# Patient Record
Sex: MALE | Race: WHITE | Employment: FULL TIME | URBAN - NONMETROPOLITAN AREA
[De-identification: names, ages, dates, MRNs, and addresses within clinical notes are randomized per-mention and may not be internally consistent; named-entity substitution may affect disease eponyms.]

---

## 2017-03-01 PROBLEM — I10 ESSENTIAL HYPERTENSION: Status: ACTIVE | Noted: 2017-03-01

## 2018-04-30 ENCOUNTER — HOSPITAL ENCOUNTER (OUTPATIENT)
Age: 58
Discharge: HOME | End: 2018-04-30
Payer: COMMERCIAL

## 2018-04-30 DIAGNOSIS — R07.9 CHEST PAIN, UNSPECIFIED TYPE: ICD-10-CM

## 2018-04-30 LAB
TROPONIN INTERP: NORMAL
TROPONIN T: <0.03 NG/ML

## 2018-04-30 PROCEDURE — 84484 ASSAY OF TROPONIN QUANT: CPT

## 2018-04-30 PROCEDURE — 36415 COLL VENOUS BLD VENIPUNCTURE: CPT

## 2018-05-25 PROBLEM — N52.9 ERECTILE DYSFUNCTION: Status: ACTIVE | Noted: 2018-05-25

## 2018-06-15 PROBLEM — R19.5 POSITIVE FIT (FECAL IMMUNOCHEMICAL TEST): Status: ACTIVE | Noted: 2018-06-15

## 2018-06-15 PROBLEM — Z80.0 FAMILY HISTORY OF COLON CANCER: Status: ACTIVE | Noted: 2018-06-15

## 2018-07-10 ENCOUNTER — HOSPITAL ENCOUNTER (OUTPATIENT)
Dept: LAB | Age: 58
Discharge: HOME | End: 2018-07-10
Payer: COMMERCIAL

## 2018-07-10 DIAGNOSIS — I10 ESSENTIAL HYPERTENSION: ICD-10-CM

## 2018-07-10 LAB
CREATININE URINE: 104.8 MG/DL (ref 39–259)
MICROALBUMIN/CREAT 24H UR: 14 MG/L
MICROALBUMIN/CREAT UR-RTO: 13 MCG/MG CREAT
PROSTATE SPECIFIC ANTIGEN: 0.78 UG/L

## 2018-07-10 PROCEDURE — 36415 COLL VENOUS BLD VENIPUNCTURE: CPT

## 2018-07-10 PROCEDURE — G0103 PSA SCREENING: HCPCS

## 2018-07-10 PROCEDURE — 82043 UR ALBUMIN QUANTITATIVE: CPT

## 2018-07-10 PROCEDURE — 82570 ASSAY OF URINE CREATININE: CPT

## 2018-07-16 ENCOUNTER — ANESTHESIA EVENT (OUTPATIENT)
Dept: OPERATING ROOM | Age: 58
End: 2018-07-16
Payer: COMMERCIAL

## 2018-07-16 ENCOUNTER — HOSPITAL ENCOUNTER (OUTPATIENT)
Age: 58
Setting detail: OUTPATIENT SURGERY
Discharge: HOME | End: 2018-07-16
Attending: INTERNAL MEDICINE | Admitting: INTERNAL MEDICINE
Payer: COMMERCIAL

## 2018-07-16 ENCOUNTER — ANESTHESIA (OUTPATIENT)
Dept: OPERATING ROOM | Age: 58
End: 2018-07-16
Payer: COMMERCIAL

## 2018-07-16 VITALS
HEART RATE: 60 BPM | TEMPERATURE: 97 F | WEIGHT: 245 LBS | DIASTOLIC BLOOD PRESSURE: 60 MMHG | SYSTOLIC BLOOD PRESSURE: 107 MMHG | RESPIRATION RATE: 18 BRPM | BODY MASS INDEX: 35.07 KG/M2 | OXYGEN SATURATION: 99 % | HEIGHT: 70 IN

## 2018-07-16 VITALS
RESPIRATION RATE: 13 BRPM | OXYGEN SATURATION: 100 % | SYSTOLIC BLOOD PRESSURE: 108 MMHG | DIASTOLIC BLOOD PRESSURE: 61 MMHG

## 2018-07-16 PROCEDURE — 3700000001 HC ADD 15 MINUTES (ANESTHESIA): Performed by: INTERNAL MEDICINE

## 2018-07-16 PROCEDURE — 2580000003 HC RX 258: Performed by: NURSE ANESTHETIST, CERTIFIED REGISTERED

## 2018-07-16 PROCEDURE — 2580000003 HC RX 258: Performed by: INTERNAL MEDICINE

## 2018-07-16 PROCEDURE — 7100000011 HC PHASE II RECOVERY - ADDTL 15 MIN: Performed by: INTERNAL MEDICINE

## 2018-07-16 PROCEDURE — 7100000010 HC PHASE II RECOVERY - FIRST 15 MIN: Performed by: INTERNAL MEDICINE

## 2018-07-16 PROCEDURE — 45385 COLONOSCOPY W/LESION REMOVAL: CPT | Performed by: INTERNAL MEDICINE

## 2018-07-16 PROCEDURE — 6360000002 HC RX W HCPCS: Performed by: NURSE ANESTHETIST, CERTIFIED REGISTERED

## 2018-07-16 PROCEDURE — 3700000000 HC ANESTHESIA ATTENDED CARE: Performed by: INTERNAL MEDICINE

## 2018-07-16 PROCEDURE — 2500000003 HC RX 250 WO HCPCS: Performed by: NURSE ANESTHETIST, CERTIFIED REGISTERED

## 2018-07-16 PROCEDURE — C1773 RET DEV, INSERTABLE: HCPCS | Performed by: INTERNAL MEDICINE

## 2018-07-16 PROCEDURE — 3609010600 HC COLONOSCOPY POLYPECTOMY SNARE/COLD BIOPSY: Performed by: INTERNAL MEDICINE

## 2018-07-16 PROCEDURE — 2709999900 HC NON-CHARGEABLE SUPPLY: Performed by: INTERNAL MEDICINE

## 2018-07-16 PROCEDURE — 88305 TISSUE EXAM BY PATHOLOGIST: CPT

## 2018-07-16 RX ORDER — PROPOFOL 10 MG/ML
INJECTION, EMULSION INTRAVENOUS CONTINUOUS PRN
Status: DISCONTINUED | OUTPATIENT
Start: 2018-07-16 | End: 2018-07-16 | Stop reason: SDUPTHER

## 2018-07-16 RX ORDER — SODIUM CHLORIDE 9 MG/ML
INJECTION, SOLUTION INTRAVENOUS CONTINUOUS PRN
Status: DISCONTINUED | OUTPATIENT
Start: 2018-07-16 | End: 2018-07-16 | Stop reason: SDUPTHER

## 2018-07-16 RX ORDER — PROPOFOL 10 MG/ML
INJECTION, EMULSION INTRAVENOUS PRN
Status: DISCONTINUED | OUTPATIENT
Start: 2018-07-16 | End: 2018-07-16 | Stop reason: SDUPTHER

## 2018-07-16 RX ORDER — LIDOCAINE HYDROCHLORIDE 20 MG/ML
INJECTION, SOLUTION INFILTRATION; PERINEURAL PRN
Status: DISCONTINUED | OUTPATIENT
Start: 2018-07-16 | End: 2018-07-16 | Stop reason: SDUPTHER

## 2018-07-16 RX ORDER — SODIUM CHLORIDE, SODIUM LACTATE, POTASSIUM CHLORIDE, CALCIUM CHLORIDE 600; 310; 30; 20 MG/100ML; MG/100ML; MG/100ML; MG/100ML
INJECTION, SOLUTION INTRAVENOUS CONTINUOUS
Status: DISCONTINUED | OUTPATIENT
Start: 2018-07-16 | End: 2018-07-16 | Stop reason: HOSPADM

## 2018-07-16 RX ADMIN — LIDOCAINE HYDROCHLORIDE 40 MG: 20 INJECTION, SOLUTION INFILTRATION; PERINEURAL at 15:33

## 2018-07-16 RX ADMIN — PROPOFOL 30 MG: 10 INJECTION, EMULSION INTRAVENOUS at 15:39

## 2018-07-16 RX ADMIN — PHENYLEPHRINE HYDROCHLORIDE 100 MCG: 10 INJECTION INTRAVENOUS at 16:24

## 2018-07-16 RX ADMIN — PROPOFOL 20 MG: 10 INJECTION, EMULSION INTRAVENOUS at 16:30

## 2018-07-16 RX ADMIN — SODIUM CHLORIDE, POTASSIUM CHLORIDE, SODIUM LACTATE AND CALCIUM CHLORIDE: 600; 310; 30; 20 INJECTION, SOLUTION INTRAVENOUS at 14:41

## 2018-07-16 RX ADMIN — PROPOFOL 30 MG: 10 INJECTION, EMULSION INTRAVENOUS at 15:43

## 2018-07-16 RX ADMIN — PROPOFOL 30 MG: 10 INJECTION, EMULSION INTRAVENOUS at 15:35

## 2018-07-16 RX ADMIN — PROPOFOL 80 MG: 10 INJECTION, EMULSION INTRAVENOUS at 15:33

## 2018-07-16 RX ADMIN — SODIUM CHLORIDE: 9 INJECTION, SOLUTION INTRAVENOUS at 16:26

## 2018-07-16 RX ADMIN — PROPOFOL 160 MCG/KG/MIN: 10 INJECTION, EMULSION INTRAVENOUS at 15:43

## 2018-07-16 RX ADMIN — PHENYLEPHRINE HYDROCHLORIDE 50 MCG: 10 INJECTION INTRAVENOUS at 16:38

## 2018-07-16 ASSESSMENT — PAIN - FUNCTIONAL ASSESSMENT: PAIN_FUNCTIONAL_ASSESSMENT: 0-10

## 2018-07-16 NOTE — PROGRESS NOTES
Discharge Criteria    Inpatients must meet Criteria 1 through 7. All other patients are either YES or N/A. If a NO is chosen then Anesthesia or Surgeon must be notified. 1.  Minimum 30 minutes after last dose of sedative medication, minimum 120 minutes after last dose of reversal agent. Yes      2. Systolic BP stable within 20 mmHg for 30 minutes & systolic BP between 90 & 987 or within 10 mmHg of baseline. Yes      3. Pulse between 60 and 100 or within 10 bpm of baseline. Yes      4. Spontaneous respiratory rate >/= 10 per minute. Yes      5. SaO2 >/= 95 or  >/= baseline. Yes      6. Able to cough and swallow or return to baseline function. Yes      7. Alert and oriented or return to baseline mental status. Yes      8. Demonstrates controlled, coordinated movements, ambulates with steady gait, or return to baseline activity function. Yes      9. Minimal or no pain or nausea, or at a level tolerable and acceptable to patient. Yes      10. Takes and retains oral fluids as allowed. Yes      11. Procedural / perioperative site stable. Minimal or no bleeding. Yes          12. If GI endoscopy procedure, minimal or no abdominal distention or passing flatus. Yes      13. Written discharge instructions and emergency telephone number provided. Yes      14. Accompanied by a responsible adult. Yes      Adult patient discharged from facility without responsible person meets above criteria plus the following:   a) remains awake without stimulus for 30 minutes     b) oriented appropriate for age      c) all vital signs stable   d) no significant risk of losing protective reflexes      e) able to maintain pre-procedure mobility without assistance   f) no nausea or dizziness      g) transportation arrangements that do not require patient to operate motor Vehicle.      N/A

## 2018-07-16 NOTE — ANESTHESIA PRE PROCEDURE
ringers infusion   Intravenous Continuous Marek Monte  mL/hr at 07/16/18 1441         Allergies:  No Known Allergies    Problem List:    Patient Active Problem List   Diagnosis Code    Varicose veins of both lower extremities I83.93    Type 2 diabetes, uncontrolled, with neuropathy (HCC) E11.40, E11.65    Diabetic retinopathy associated with diabetes mellitus due to underlying condition ([facility] E08.319    Essential hypertension I10    Erectile dysfunction N52.9    Positive FIT (fecal immunochemical test) R19.5    Family history of colon cancer Z80.0       Past Medical History:        Diagnosis Date    DM [facility]diabetes mellitus) [facility]     Varicose veins of both legs with edema     many years       Past Surgical History:        Procedure Laterality Date    OTHER SURGICAL HISTORY      Laser surgery both eyes for diabetic retinopathy       Social History:    Social History   Substance Use Topics    Smoking status: Never Smoker    Smokeless tobacco: Never Used    Alcohol use Yes      Comment: VERY RARE                                Counseling given: Not Answered      Vital Signs (Current):   Vitals:    07/16/18 1417   BP: 112/81   Pulse: 88   Resp: 18   TempSrc: Temporal   SpO2: 98%   Weight: 245 lb (111.1 kg)   Height: 5' 10\" (1.778 m)                                              BP Readings from Last 3 Encounters:   07/16/18 112/81   05/25/18 138/72   04/30/18 131/80       NPO Status: Time of last liquid consumption: 1245 (finished prep at 0845, had cup of water at 1245)                        Time of last solid consumption: 1100                        Date of last liquid consumption: 07/16/18                        Date of last solid food consumption: 07/15/18    BMI:   Wt Readings from Last 3 Encounters:   07/16/18 245 lb (111.1 kg)   05/25/18 260 lb (117.9 kg)   01/26/18 269 lb (122 kg)     Body mass index is 35.15 kg/m².     CBC:   Lab Results   Component Value Date    WBC 6.9 11/08/2016    RBC 5.38 11/08/2016    HGB 15.4 11/08/2016    HCT 46.2 11/08/2016    MCV 85.9 11/08/2016    RDW 13.5 11/08/2016     11/08/2016       CMP:   Lab Results   Component Value Date     11/08/2016    K 4.3 11/08/2016     11/08/2016    CO2 28 11/08/2016    BUN 12 11/08/2016    CREATININE 0.78 11/08/2016    GFRAA >60 11/08/2016    LABGLOM >60 11/08/2016    GLUCOSE 86 11/08/2016    PROT 7.2 11/08/2016    CALCIUM 9.4 11/08/2016    BILITOT 0.46 11/08/2016    ALKPHOS 65 11/08/2016    AST 24 11/08/2016    ALT 32 11/08/2016       POC Tests: No results for input(s): POCGLU, POCNA, POCK, POCCL, POCBUN, POCHEMO, POCHCT in the last 72 hours. Coags: No results found for: PROTIME, INR, APTT    HCG (If Applicable): No results found for: PREGTESTUR, PREGSERUM, HCG, HCGQUANT     ABGs: No results found for: PHART, PO2ART, UGM0OOE, OSF5VNH, BEART, C0SNWMGP     Type & Screen (If Applicable):  No results found for: LABABO, [address]    Anesthesia Evaluation  Patient summary reviewed and Nursing notes reviewed no history of anesthetic complications:   Airway: Mallampati: II  TM distance: >3 FB   Neck ROM: full  Mouth opening: > = 3 FB Dental:          Pulmonary:Negative Pulmonary ROS breath sounds clear to auscultation                             Cardiovascular:  Exercise tolerance: good (>4 METS),   (+) hypertension: mild,         Rhythm: regular  Rate: normal           Beta Blocker:  Not on Beta Blocker         Neuro/Psych:   Negative Neuro/Psych ROS              GI/Hepatic/Renal:   (+) bowel prep,           Endo/Other:    (+) DiabetesType II DM, well controlled, using insulin, . Pt had PAT visit. Abdominal:       Abdomen: soft. Vascular: negative vascular ROS. Anesthesia Plan      general and TIVA     ASA 2     (Plan discussed with patient. Pt drank water around 1 pm.  Discussed NPO status and risks.  )  Induction: intravenous.       Anesthetic plan and risks discussed with

## 2018-07-18 LAB — SURGICAL PATHOLOGY REPORT: NORMAL

## 2018-07-23 DIAGNOSIS — Z80.0 FAMILY HISTORY OF COLON CANCER: ICD-10-CM

## 2018-07-23 DIAGNOSIS — R19.5 POSITIVE FIT (FECAL IMMUNOCHEMICAL TEST): ICD-10-CM

## 2020-02-04 ENCOUNTER — HOSPITAL ENCOUNTER (OUTPATIENT)
Age: 60
Discharge: HOME | End: 2020-02-04
Payer: COMMERCIAL

## 2020-02-04 LAB
BUN BLDV-MCNC: 19 MG/DL (ref 6–20)
CREAT SERPL-MCNC: 0.96 MG/DL (ref 0.7–1.2)
GFR AFRICAN AMERICAN: >60 ML/MIN
GFR NON-AFRICAN AMERICAN: >60 ML/MIN
GFR SERPL CREATININE-BSD FRML MDRD: NORMAL ML/MIN/{1.73_M2}
GFR SERPL CREATININE-BSD FRML MDRD: NORMAL ML/MIN/{1.73_M2}

## 2020-02-04 PROCEDURE — 84520 ASSAY OF UREA NITROGEN: CPT

## 2020-02-04 PROCEDURE — 82565 ASSAY OF CREATININE: CPT

## 2020-02-04 PROCEDURE — 36415 COLL VENOUS BLD VENIPUNCTURE: CPT

## 2020-12-07 PROBLEM — E11.3499 SEVERE NONPROLIFERATIVE DIABETIC RETINOPATHY ASSOCIATED WITH TYPE 2 DIABETES MELLITUS (HCC): Status: ACTIVE | Noted: 2019-10-25

## 2020-12-07 PROBLEM — E11.3519 PROLIFERATIVE RETINOPATHY WITH RETINAL EDEMA DUE TO TYPE 2 DIABETES MELLITUS (HCC): Status: ACTIVE | Noted: 2019-10-25

## 2020-12-07 PROBLEM — H25.13 AGE-RELATED NUCLEAR CATARACT OF BOTH EYES: Status: ACTIVE | Noted: 2019-10-25

## 2021-04-06 RX ORDER — BLOOD SUGAR DIAGNOSTIC
1 STRIP MISCELLANEOUS 2 TIMES DAILY
Qty: 100 EACH | Refills: 3 | Status: SHIPPED | OUTPATIENT
Start: 2021-04-06 | End: 2022-05-06

## 2021-04-12 ENCOUNTER — OFFICE VISIT (OUTPATIENT)
Dept: PRIMARY CARE CLINIC | Age: 61
End: 2021-04-12
Payer: COMMERCIAL

## 2021-04-12 ENCOUNTER — HOSPITAL ENCOUNTER (OUTPATIENT)
Age: 61
Discharge: HOME | End: 2021-04-12
Payer: COMMERCIAL

## 2021-04-12 VITALS
RESPIRATION RATE: 16 BRPM | WEIGHT: 262 LBS | DIASTOLIC BLOOD PRESSURE: 86 MMHG | BODY MASS INDEX: 36.68 KG/M2 | HEIGHT: 71 IN | HEART RATE: 76 BPM | SYSTOLIC BLOOD PRESSURE: 138 MMHG

## 2021-04-12 DIAGNOSIS — Z12.5 ENCOUNTER FOR PROSTATE CANCER SCREENING: ICD-10-CM

## 2021-04-12 DIAGNOSIS — Z79.4 TYPE 2 DIABETES MELLITUS WITH DIABETIC NEUROPATHY, WITH LONG-TERM CURRENT USE OF INSULIN (HCC): ICD-10-CM

## 2021-04-12 DIAGNOSIS — I10 ESSENTIAL HYPERTENSION: ICD-10-CM

## 2021-04-12 DIAGNOSIS — D12.6 ADENOMATOUS POLYP OF COLON, UNSPECIFIED PART OF COLON: ICD-10-CM

## 2021-04-12 DIAGNOSIS — E11.40 TYPE 2 DIABETES MELLITUS WITH DIABETIC NEUROPATHY, WITH LONG-TERM CURRENT USE OF INSULIN (HCC): Primary | ICD-10-CM

## 2021-04-12 DIAGNOSIS — E11.40 TYPE 2 DIABETES MELLITUS WITH DIABETIC NEUROPATHY, WITH LONG-TERM CURRENT USE OF INSULIN (HCC): ICD-10-CM

## 2021-04-12 DIAGNOSIS — Z79.4 TYPE 2 DIABETES MELLITUS WITH DIABETIC NEUROPATHY, WITH LONG-TERM CURRENT USE OF INSULIN (HCC): Primary | ICD-10-CM

## 2021-04-12 LAB
ABSOLUTE EOS #: 0.39 K/UL (ref 0–0.44)
ABSOLUTE IMMATURE GRANULOCYTE: 0.03 K/UL (ref 0–0.3)
ABSOLUTE LYMPH #: 1.47 K/UL (ref 1.1–3.7)
ABSOLUTE MONO #: 0.65 K/UL (ref 0.1–1.2)
ALBUMIN SERPL-MCNC: 4.3 G/DL (ref 3.5–5.2)
ALBUMIN/GLOBULIN RATIO: 1.4 (ref 1–2.5)
ALP BLD-CCNC: 61 U/L (ref 40–129)
ALT SERPL-CCNC: 44 U/L (ref 5–41)
ANION GAP SERPL CALCULATED.3IONS-SCNC: 9 MMOL/L (ref 9–17)
AST SERPL-CCNC: 24 U/L
BASOPHILS # BLD: 1 % (ref 0–2)
BASOPHILS ABSOLUTE: 0.04 K/UL (ref 0–0.2)
BILIRUB SERPL-MCNC: 0.45 MG/DL (ref 0.3–1.2)
BUN BLDV-MCNC: 13 MG/DL (ref 6–20)
BUN/CREAT BLD: 15 (ref 9–20)
CALCIUM SERPL-MCNC: 10.2 MG/DL (ref 8.6–10.4)
CHLORIDE BLD-SCNC: 101 MMOL/L (ref 98–107)
CHOLESTEROL/HDL RATIO: 4.5
CHOLESTEROL: 190 MG/DL
CO2: 26 MMOL/L (ref 20–31)
CREAT SERPL-MCNC: 0.87 MG/DL (ref 0.7–1.2)
DIFFERENTIAL TYPE: ABNORMAL
EOSINOPHILS RELATIVE PERCENT: 6 % (ref 1–4)
GFR AFRICAN AMERICAN: >60 ML/MIN
GFR NON-AFRICAN AMERICAN: >60 ML/MIN
GFR SERPL CREATININE-BSD FRML MDRD: ABNORMAL ML/MIN/{1.73_M2}
GFR SERPL CREATININE-BSD FRML MDRD: ABNORMAL ML/MIN/{1.73_M2}
GLUCOSE BLD-MCNC: 173 MG/DL (ref 70–99)
HBA1C MFR BLD: 8.5 %
HCT VFR BLD CALC: 43.7 % (ref 40.7–50.3)
HDLC SERPL-MCNC: 42 MG/DL
HEMOGLOBIN: 15 G/DL (ref 13–17)
IMMATURE GRANULOCYTES: 1 %
LDL CHOLESTEROL: 111 MG/DL (ref 0–130)
LYMPHOCYTES # BLD: 22 % (ref 24–43)
MCH RBC QN AUTO: 29.7 PG (ref 25.2–33.5)
MCHC RBC AUTO-ENTMCNC: 34.3 G/DL (ref 28.4–34.8)
MCV RBC AUTO: 86.5 FL (ref 82.6–102.9)
MONOCYTES # BLD: 10 % (ref 3–12)
NRBC AUTOMATED: 0 PER 100 WBC
PDW BLD-RTO: 12.6 % (ref 11.8–14.4)
PLATELET # BLD: 211 K/UL (ref 138–453)
PLATELET ESTIMATE: ABNORMAL
PMV BLD AUTO: 10.5 FL (ref 8.1–13.5)
POTASSIUM SERPL-SCNC: 5.2 MMOL/L (ref 3.7–5.3)
PROSTATE SPECIFIC ANTIGEN: 0.89 UG/L
RBC # BLD: 5.05 M/UL (ref 4.21–5.77)
RBC # BLD: ABNORMAL 10*6/UL
SEG NEUTROPHILS: 60 % (ref 36–65)
SEGMENTED NEUTROPHILS ABSOLUTE COUNT: 4.04 K/UL (ref 1.5–8.1)
SODIUM BLD-SCNC: 136 MMOL/L (ref 135–144)
TOTAL PROTEIN: 7.3 G/DL (ref 6.4–8.3)
TRIGL SERPL-MCNC: 185 MG/DL
VLDLC SERPL CALC-MCNC: ABNORMAL MG/DL (ref 1–30)
WBC # BLD: 6.6 K/UL (ref 3.5–11.3)
WBC # BLD: ABNORMAL 10*3/UL

## 2021-04-12 PROCEDURE — 36415 COLL VENOUS BLD VENIPUNCTURE: CPT

## 2021-04-12 PROCEDURE — 80061 LIPID PANEL: CPT

## 2021-04-12 PROCEDURE — 83036 HEMOGLOBIN GLYCOSYLATED A1C: CPT | Performed by: FAMILY MEDICINE

## 2021-04-12 PROCEDURE — 85025 COMPLETE CBC W/AUTO DIFF WBC: CPT

## 2021-04-12 PROCEDURE — G0103 PSA SCREENING: HCPCS

## 2021-04-12 PROCEDURE — 80053 COMPREHEN METABOLIC PANEL: CPT

## 2021-04-12 PROCEDURE — 3052F HG A1C>EQUAL 8.0%<EQUAL 9.0%: CPT | Performed by: FAMILY MEDICINE

## 2021-04-12 PROCEDURE — 99214 OFFICE O/P EST MOD 30 MIN: CPT | Performed by: FAMILY MEDICINE

## 2021-04-12 RX ORDER — INSULIN LISPRO 100 [IU]/ML
INJECTION, SOLUTION INTRAVENOUS; SUBCUTANEOUS
Qty: 1 PEN | Refills: 3 | Status: SHIPPED | OUTPATIENT
Start: 2021-04-12

## 2021-04-12 ASSESSMENT — PATIENT HEALTH QUESTIONNAIRE - PHQ9
1. LITTLE INTEREST OR PLEASURE IN DOING THINGS: 1
SUM OF ALL RESPONSES TO PHQ QUESTIONS 1-9: 2
SUM OF ALL RESPONSES TO PHQ QUESTIONS 1-9: 2
SUM OF ALL RESPONSES TO PHQ9 QUESTIONS 1 & 2: 2
SUM OF ALL RESPONSES TO PHQ QUESTIONS 1-9: 2

## 2021-04-12 NOTE — PATIENT INSTRUCTIONS
SURVEY:    You may be receiving a survey from [employer] regarding your visit today. You may get this in the mail, through your MyChart, or in your email. Please complete the survey to enable us to provide the highest quality of care to you and your family. If you cannot score us a very good (5 Stars) on any question, please call the office to discuss how we could of made your experience exceptional.    Thank you!     Dr. Aj Diallo, WESLEY  [facility], [address] [place] [address], [facility]    Phone: [phone]  Fax: [phone]    Office Hours:   Jose Navarro [place], F: 8-5 Wednesday: 9-11

## 2021-04-12 NOTE — PROGRESS NOTES
Manuel Cabrera is a 62 y.o. male here for routine follow up of diabetes. He has been checking  his blood glucose levels regularly. He reports numbness and tingling in the feet. He said he has neuropathy. He has not been compliant with diet and exercise. He has been compliant with his medications. He is tolerating medications. Hypertension: Patient here for follow-up of elevated blood pressure. He is not exercising but will be more in the summer and is adherent to low salt diet. Blood pressure is not well controlled at home. Cardiac symptoms palpitations. Patient denies chest pain and fatigue. Cardiovascular risk factors: advanced age (older than 54 for men, 72 for women), diabetes mellitus, hypertension and male gender. Use of agents associated with hypertension: none. Allergies:  Patient has no known allergies.     Past Medical History:    Past Medical History:   Diagnosis Date    DM [facility]diabetes mellitus) ([facility]     Varicose veins of both legs with edema     many years       Past Surgical History:    Past Surgical History:   Procedure Laterality Date    COLONOSCOPY N/A 7/16/2018    COLONOSCOPY POLYPECTOMY SNARE/COLD BIOPSY performed by Rachel Gallardo MD at [address] [place] [address]  07/16/2018    -polyps(adenomatouspolyps,portions of adenomatous polyps and tubulovillous adenoma)    OTHER SURGICAL HISTORY      Laser surgery both eyes for diabetic retinopathy       Social History:   Social History     Tobacco Use    Smoking status: Never Smoker    Smokeless tobacco: Never Used   Substance Use Topics    Alcohol use: Yes     Comment: VERY RARE       Family History:   Family History   Problem Relation Age of Onset    Diabetes Mother     Emphysema Father     Diabetes Father     Colon Cancer Father          Review of Systems:  Constitutional: negative for fever or chills  Eyes: negative for visual disturbance   ENT: negative for sore throat or nasal congestion  Respiratory: negative for cough, shortness of breath and sputum  Cardiovascular: negative for chest pain,pnd,claudication or palpitations  Gastrointestinal: negative for abd pain, stella,nausea, vomiting, diarrhea or constipation  Genitourinary: negative for dysuria,,hematuria urgency or frequency  Musculoskeletal:negative for arthralgias, muscle weakness and stiff joints   Integument/breast: negative for skin rash or lesions  Neurological: positive for   numbness and tingling. Psych: negative for anxiety, depression, suicidial ideation and suicidal attempt. Objective:  Physical Exam:  /86 (Site: Right Upper Arm, Position: Sitting)   Pulse 76   Resp 16   Ht 5' 11\" (1.803 m)   Wt 262 lb (118.8 kg)   BMI 36.54 kg/m²   GEN:   He is alert and oriented  ENT:    ENT exam normal, no neck nodes or sinus tenderness  NECK:   neck supple and non tender without mass, no thyromegaly or thyroid nodules, no cervical lymphadenopathy  EYES:   No gross abnormalities. CVS:     CVS exam BP noted to be well controlled today in office, S1, S2 normal, no gallop, no murmur, chest clear, no JVD, no HSM, no edema  PULM:   chest clear, no wheezing, rales, normal symmetric air entry, no tachypnea, retractions or cyanosis  ABD:   exam deferred  MUSC: no joint tenderness, deformity or swelling  Skin : no  rash or lesions  EXT: {EXTREMITIES EXAM:56724::\"Extremities: + 2 pedal pulses, no edema or calf tenderness, and warm to touch. Nails -deformed,has multiple calocities  FEET:  no open sores are present bilaterally  NEURO: monofilament decreased bilaterally DTR -normal        Diagnostic Data:  Lab Results   Component Value Date    GLUCOSE 86 11/08/2016    LABA1C 8.5 04/12/2021    BUN 19 02/04/2020     11/08/2016    K 4.3 11/08/2016    CALCIUM 9.4 11/08/2016     11/08/2016    CO2 28 11/08/2016       Assessment:  1. Type 2 diabetes mellitus with diabetic neuropathy, with long-term current use of insulin ([facility]    2.  Essential hypertension    3. Encounter for prostate cancer screening    4. Adenomatous polyp of colon, unspecified part of colon      Plan   Diagnosis Orders   1. Type 2 diabetes mellitus with diabetic neuropathy, with long-term current use of insulin (HCC)  POCT glycosylated hemoglobin (Hb A1C)    CBC Auto Differential    Comprehensive Metabolic Panel    Lipid Panel     DIABETES FOOT EXAM   2. Essential hypertension     3. Encounter for prostate cancer screening  PSA screening   4. Adenomatous polyp of colon, unspecified part of colon  Edwige Rivas [facility], [facility]       · Check BS 2 times per day  · Medication change: none  · Encouraged low fat, low sodium and diabetic, 1800 calorie diet.   · Goal for blood pressure control is 120/80  · Recommended regular exercise as tolerated, 5 times per week  · Labs reviewed -hba1c 8.6  · Labs ordered: cbc,cmp,lipid,psa  ascivd risk  Orders Placed This Encounter   Procedures    CBC Auto Differential     Standing Status:   Future     Standing Expiration Date:   4/12/2022    Comprehensive Metabolic Panel     Standing Status:   Future     Standing Expiration Date:   4/12/2022    Lipid Panel     Standing Status:   Future     Standing Expiration Date:   4/12/2022     Order Specific Question:   Is Patient Fasting?/# of Hours     Answer:   yes    PSA screening     Standing Status:   Future     Standing Expiration Date:   4/12/2022   Charles Holland MD, [facility]     Referral Priority:   Routine     Referral Type:   Eval and Treat     Referral Reason:   Specialty Services Required     Referred to Provider:   Jenni Peabody, MD     Requested Specialty:   General Surgery     Number of Visits Requested:   1    POCT glycosylated hemoglobin (Hb A1C)     DIABETES FOOT EXAM       Current Outpatient Medications   Medication Sig Dispense Refill    insulin lispro, 1 Unit Dial, (HUMALOG KWIKPEN) 100 UNIT/ML SOPN Inject twice a day as per coverage schedule FS <150 -no coverage 151-200 - 4 units 201-250 - 6 units 251-300 - 8 units 301- 350 - 10 units 351- 400 -12 units >401- call MD 1 pen 3    Insulin Syringe-Needle U-100 (RELION INSULIN SYRINGE 1ML/31G) 31G X 5/16\" 1 ML MISC Inject 1 each into the skin 2 times daily 100 each 3    Insulin Pen Needle (PEN NEEDLES) 31G X 5 MM MISC Inject 1 Units into the skin 2 times daily 100 each 1    pioglitazone (ACTOS) 30 MG tablet Take 1 tablet by mouth daily 90 tablet 2    Insulin NPH Isophane & Regular (HUMULIN;NOVOLIN) (70-30) 100 UNIT per ML injection pen Inject 60 Units into the skin 2 times daily 5 pen 3    metFORMIN (GLUCOPHAGE) 500 MG tablet Take 1 tablet by mouth 2 times daily (with meals) 180 tablet 3    glucose blood VI test strips (ASCENSIA AUTODISC VI;ONE TOUCH ULTRA TEST VI) strip 1 each by In Vitro route daily As needed. 100 each 3    INSULIN SYRINGE 1CC/29G (AIMSCO INSULIN SYR ULTRA THIN) 29G X 1/2\" 1 ML MISC 1 each by Does not apply route 2 times daily DX:E11.9 200 each 3    losartan (COZAAR) 25 MG tablet Take 2 tablets by mouth daily (Patient not taking: Reported on 9/22/2020) 90 tablet 0    aspirin 81 MG tablet Take 81 mg by mouth daily      atorvastatin (LIPITOR) 20 MG tablet Take 1 tablet by mouth daily (Patient not taking: Reported on 9/22/2020) 90 tablet 0     No current facility-administered medications for this visit. No follow-ups on file.       Electronically signed by Aidee Martinez MD on 4/12/2021 at 9:59 AM

## 2021-05-07 ENCOUNTER — OFFICE VISIT (OUTPATIENT)
Dept: SURGERY | Age: 61
End: 2021-05-07
Payer: COMMERCIAL

## 2021-05-07 VITALS
SYSTOLIC BLOOD PRESSURE: 150 MMHG | TEMPERATURE: 98.6 F | WEIGHT: 262 LBS | DIASTOLIC BLOOD PRESSURE: 78 MMHG | BODY MASS INDEX: 36.54 KG/M2 | HEART RATE: 86 BPM

## 2021-05-07 DIAGNOSIS — Z01.818 PRE-OP TESTING: Primary | ICD-10-CM

## 2021-05-07 DIAGNOSIS — Z86.010 HISTORY OF COLON POLYPS: Primary | ICD-10-CM

## 2021-05-07 DIAGNOSIS — Z80.0 FAMILY HISTORY OF COLON CANCER: ICD-10-CM

## 2021-05-07 PROCEDURE — 99999 PR OFFICE/OUTPT VISIT,PROCEDURE ONLY: CPT | Performed by: SURGERY

## 2021-05-07 NOTE — LETTER
[facility] GENERAL SURGERY Part of [place] [address]  Phone: [phone]  Fax: [phone]    Jacqueline Wilson MD        May 9, 2021     Arcelia Pandey MD  [facility] [address]    Patient: Emanuel Drummond  MR Number: [id number]  YOB: 1960  Date of Visit: 5/7/2021    Dear Dr. Arcelia Pandey:    Thank you for the request for consultation for [facility] to me for the evaluation of history of colon polyps. Below are the relevant portions of my assessment and plan of care. ASSESSMENT     Diagnosis Orders   1. History of colon polyps     2. BMI 36.0-36.9,adult     3. Family history of colon cancer         PLAN    We will proceed with screening colonoscopy in the coming weeks. Risks, benefits, alternatives thoroughly reviewed and accepted by Sarah Trejo, including GI bleeding, perforation, missed lesions, COVID-19 exposure/infection, etc.    If you have questions, please do not hesitate to call me. I look forward to following Ari Lucas along with you.     Sincerely,        Jacqueline Wilson MD

## 2021-05-09 ASSESSMENT — ENCOUNTER SYMPTOMS
TROUBLE SWALLOWING: 0
COUGH: 0
BACK PAIN: 1
VOMITING: 0
BLOOD IN STOOL: 0
CHOKING: 0
SHORTNESS OF BREATH: 0
NAUSEA: 0
ABDOMINAL PAIN: 0
SORE THROAT: 0

## 2021-05-09 NOTE — PATIENT INSTRUCTIONS
Patient Education        Learning About Colonoscopy  What is a colonoscopy? A colonoscopy is a test (also called a procedure) that lets a doctor look inside your large intestine. The doctor uses a thin, lighted tube called a colonoscope. The doctor uses it to look for small growths called polyps, colon or rectal cancer (colorectal cancer), or other problems like bleeding. During the procedure, the doctor can take samples of tissue. The samples can then be checked for cancer or other conditions. The doctor can also take out polyps. How is a colonoscopy done? This procedure is done in a doctor's office or a clinic or hospital. You will get medicine to help you relax and not feel pain. Some people find that they don't remember having the test because of the medicine. The doctor gently moves the colonoscope, or scope, through the colon. The scope is also a small video camera. It lets the doctor see the colon and take pictures. How do you prepare for the procedure? You need to clean out your colon before the procedure so the doctor can see all of your colon. This process may start a day or two before the test. This depends on which \"colon prep\" your doctor recommends. To clean your colon, you stop eating solid foods and drink only clear liquids. You can have water, tea, coffee, clear juices, clear broths, flavored ice pops, and gelatin (such as Jell-O). Do not drink anything red or purple. The day or night before the procedure, you drink a large amount of a special liquid. This causes loose, frequent stools. You will go to the bathroom a lot. It's very important to drink all of the liquid. If you have problems drinking it, call your doctor. Some people don't go to work or do their usual activities on the day of the prep. Arrange to have someone take you home after the test.  What can you expect after a colonoscopy? Your doctor will tell you when you can eat and do your usual activities.   Drink a lot of fluid after the test to replace the fluids you may have lost during the colon prep. But don't drink alcohol. Your doctor will talk to you about when you'll need your next colonoscopy. The results of your test and your risk for colorectal cancer will help your doctor decide how often you need to be checked. After the test, you may be bloated or have gas pains. You may need to pass gas. If a biopsy was done or a polyp was removed, you may have streaks of blood in your stool (feces) for a few days. If polyps were taken out, your doctor may tell you to avoid taking aspirin and nonsteroidal anti-inflammatory drugs (NSAIDs) for 7 to 14 days. Problems such as heavy rectal bleeding may not occur until several weeks after the test. This isn't common. But it can happen after polyps are removed. Follow-up care is a key part of your treatment and safety. Be sure to make and go to all appointments, and call your doctor if you are having problems. It's also a good idea to know your test results and keep a list of the medicines you take. Where can you learn more? Go to https://goOutMap.[employer]. org and sign in to your [employer] account. Enter X208 in the [facility] box to learn more about \"Learning About Colonoscopy. \"     If you do not have an account, please click on the \"Sign Up Now\" link. Current as of: December 17, 2020               Content Version: 12.8  © 4556-1430 [employer]. Care instructions adapted under license by [facility] [place] ([facility]). If you have questions about a medical condition or this instruction, always ask your healthcare professional. [place] [address] any warranty or liability for your use of this information.

## 2021-05-09 NOTE — PROGRESS NOTES
Terese aRe MD  General Surgery, Endoscopy  Chief Medical Officer    [address] [facility]  [address] [address]  Dept: [phone]  Fax: [phone] Decker  Chief Complaint   Patient presents with    New Patient     colonoscopy consult. Last scope 2018-MD Manjinder. Hx polyps. Family hx colon cancer-grandmother, father, sister. Denies change in bowel habits. C/o occasional constipation, reports using metamucil, helping. Denies bleeding. Referred by Timothy Espinoza MD       HPI    Mr Ravindra Nicholas is a pleasant 60-year-old white male kindly referred to me by Dr. Timothy Espinoza for screening colonoscopy. He has no GI complaints. No abdominal pain. Normal appetite. No recent weight changes, 262 pounds, BMI 37. Bowel habits are typically formed and brown, without blood. Occasional constipation. No cough, fever nor other respiratory symptoms. No history of COVID-19. Colonoscopy July 2018 with tubulovillous cecal polypectomy. Adenomatous polypectomies of the descending and transverse colon also provided. Family history of colon cancer involving father, sister, paternal grandmother. Patient has never smoked. Review of Systems   Constitutional: Negative for activity change, appetite change, chills, fever and unexpected weight change. HENT: Negative for nosebleeds, sneezing, sore throat and trouble swallowing. Eyes: Negative for visual disturbance. Respiratory: Negative for cough, choking and shortness of breath. Cardiovascular: Negative for chest pain, palpitations and leg swelling. Gastrointestinal: Negative for abdominal pain, blood in stool, nausea and vomiting. Genitourinary: Negative for dysuria, flank pain and hematuria. Musculoskeletal: Positive for arthralgias and back pain. Negative for gait problem and myalgias. Allergic/Immunologic: Negative for immunocompromised state. Neurological: Negative for dizziness, seizures, syncope, weakness and headaches. Hematological: Does not bruise/bleed easily. Psychiatric/Behavioral: Negative for confusion and sleep disturbance. Past Medical History:   Diagnosis Date    DM (diabetes mellitus) ([address]     Varicose veins of both legs with edema     many years       Past Surgical History:   Procedure Laterality Date    COLONOSCOPY N/A 7/16/2018    COLONOSCOPY POLYPECTOMY SNARE/COLD BIOPSY performed by Rob Gusman MD at [address] [place] [address]  07/16/2018    -polyps(adenomatouspolyps,portions of adenomatous polyps and tubulovillous adenoma)    OTHER SURGICAL HISTORY      Laser surgery both eyes for diabetic retinopathy       Family History   Problem Relation Age of Onset    Diabetes Mother     Emphysema Father     Diabetes Father     Colon Cancer Father        Allergies:  See list    Current Outpatient Medications   Medication Sig Dispense Refill    insulin lispro, 1 Unit Dial, (HUMALOG KWIKPEN) 100 UNIT/ML SOPN Inject twice a day as per coverage schedule FS <150 -no coverage 151-200 - 4 units 201-250 - 6 units 251-300 - 8 units 301- 350 - 10 units 351- 400 -12 units >401- call MD 1 pen 3    Insulin NPH Isophane & Regular (HUMULIN;NOVOLIN) (70-30) 100 UNIT per ML injection pen Inject 60 Units into the skin 2 times daily 5 pen 3    Insulin Syringe-Needle U-100 (RELION INSULIN SYRINGE 1ML/31G) 31G X 5/16\" 1 ML MISC Inject 1 each into the skin 2 times daily 100 each 3    Insulin Pen Needle (PEN NEEDLES) 31G X 5 MM MISC Inject 1 Units into the skin 2 times daily 100 each 1    pioglitazone (ACTOS) 30 MG tablet Take 1 tablet by mouth daily (Patient not taking: Reported on 5/7/2021) 90 tablet 2    metFORMIN (GLUCOPHAGE) 500 MG tablet Take 1 tablet by mouth 2 times daily (with meals) (Patient not taking: Reported on 5/7/2021) 180 tablet 3    glucose blood VI test strips (ASCENSIA AUTODISC VI;ONE TOUCH ULTRA TEST VI) strip 1 each by In Vitro route daily As needed.  100 each 3    INSULIN SYRINGE 1CC/29G (AIMSCO INSULIN SYR ULTRA THIN) 29G X 1/2\" 1 ML MISC 1 each by Does not apply route 2 times daily DX:E11.9 200 each 3    losartan (COZAAR) 25 MG tablet Take 2 tablets by mouth daily (Patient not taking: Reported on 9/22/2020) 90 tablet 0    aspirin 81 MG tablet Take 81 mg by mouth daily      atorvastatin (LIPITOR) 20 MG tablet Take 1 tablet by mouth daily (Patient not taking: Reported on 5/7/2021) 90 tablet 0     No current facility-administered medications for this visit.         Social History     Socioeconomic History    Marital status: Legally      Spouse name: Not on file    Number of children: 2    Years of education: Not on file    Highest education level: Not on file   Occupational History    Occupation:    Social Needs    Financial resource strain: Not on file    Food insecurity     Worry: Not on file     Inability: Not on file   [email or web address] needs     Medical: Not on file     Non-medical: Not on file   Tobacco Use    Smoking status: Never Smoker    Smokeless tobacco: Never Used   Substance and Sexual Activity    Alcohol use: Yes     Comment: VERY RARE    Drug use: No    Sexual activity: Not Currently   Lifestyle    Physical activity     Days per week: Not on file     Minutes per session: Not on file    Stress: Not on file   Relationships    Social connections     Talks on phone: More than three times a week     Gets together: Once a week     Attends [employer] service: Not on file     Active member of club or organization: Not on file     Attends meetings of clubs or organizations: Not on file     Relationship status:     Intimate partner violence     Fear of current or ex partner: Not on file     Emotionally abused: Not on file     Physically abused: Not on file     Forced sexual activity: Not on file   Other Topics Concern    Not on file   Social History Narrative    Not on file       BP (!) 150/78   Pulse 86   Temp 98.6 °F (37 °C)   Wt 262 lb (118.8 kg)   BMI 36.54 kg/m²      Physical Exam  Vitals signs and nursing note reviewed. Constitutional:       Appearance: He is well-developed. HENT:      Head: Normocephalic and atraumatic. Eyes:      General: No scleral icterus. Conjunctiva/sclera: Conjunctivae normal.      Pupils: Pupils are equal, round, and reactive to light. Neck:      Musculoskeletal: Normal range of motion and neck supple. Vascular: No JVD. Trachea: No tracheal deviation. Cardiovascular:      Rate and Rhythm: Normal rate and regular rhythm. Pulmonary:      Effort: Pulmonary effort is normal. No respiratory distress. Chest:      Chest wall: No tenderness. Abdominal:      General: There is no distension. Palpations: Abdomen is soft. There is no mass. Tenderness: There is no abdominal tenderness. There is no guarding or rebound. Musculoskeletal: Normal range of motion. Lymphadenopathy:      Cervical: No cervical adenopathy. Skin:     General: Skin is warm and dry. Findings: No erythema or rash. Neurological:      Mental Status: He is alert and oriented to person, place, and time. Cranial Nerves: No cranial nerve deficit. Psychiatric:         Behavior: Behavior normal.         Thought Content:  Thought content normal.         Judgment: Judgment normal.         IMAGING/LABS    7/18/2018  2:40 PM - Reynold, [facility] Incoming Lab Results From [facility]    Component Collected Lab   Surgical Pathology Report 07/16/2018  3:31  [place]    (NOTE)   [id number]   [address],  [address] [place] [address] [place], [address]   [phone]   Fax: [phone]     0 [facility]     Patient Name: Fabián Rubio: [id number]   Path Number: [id number]   Collected: 7/16/2018   Received: 7/17/2018   Reported: 7/18/2018 14:40     -- Diagnosis --   1.  CECUM, BIOPSIES:       -  PORTIONS OF ADENOMATOUS POLYPS AND TUBULOVILLOUS ADENOMA. 2.  DESCENDING COLON, BIOPSIES:       -  ADENOMATOUS POLYPS. 3.  TRANSVERSE COLON, BIOPSIES:       -  ADENOMATOUS POLYPS. Felix Francois M.D.   **Electronically Signed Out**         jet/7/18/2018       Clinical Information   Operative Findings:  CECAL POLYPS; DESCENDING COLON POLYPS; TRANSVERSE   COLON POLYPS   Operation Performed:  COLONOSCOPY, POLYPECTOMY SNARE/COLD BIOPSY     Source of Specimen   1: CECAL POLYPS   2: DESCENDING COLON POLYPS   3: TRANSVERSE COLON POLYPS     Gross Description   1.  \"SPENSER NORTH, CECAL POLYPS\" Multiple tan tissue fragments from <   0.1 to 0.4 cm and are 2.0 x 1.5 x 0.1 cm in aggregate.  Entirely 1cs. 2.  \"SPENSER NORTH, DESCENDING COLON POLYPS\" Two tan polypoid tissue   fragments from 0.6 to 1.2 cm and are 1.7 x 0.6 x 0.2 cm in aggregate. Entirely 1cs. 3.  \"SPENSER NORTH, TRANSVERSE COLON POLYPS\" Two tan polypoid tissue   fragments from 0.3 to 0.5 cm and are 1.2 x 0.5 x 0.2 cm in aggregate. Entirely 1cs.  po tm       Microscopic Description   1. Sections of colonic mucosa show crowded tubular and villous shaped   glands lined by hyperchromatic columnar epithelial cells. Ceil Elva is no   evidence of high grade dysplasia or invasive malignancy. 2. Sections of colonic mucosa show crowded tubular shaped glands lined   by hyperchromatic columnar epithelial cells. Ceil [place] is no evidence of   high grade dysplasia or invasive malignancy. 3. Sections of colonic mucosa show crowded tubular shaped glands lined   by hyperchromatic columnar epithelial cells. Ceil Elva is no evidence of   high grade dysplasia or invasive malignancy. Testing Performed By    Braydon Martin Name Director Address Valid Date Range   [address] [address] 08/30/17 0801-Present   Lab and Collection    Surgical Pathology - 7/16/2018      ASSESSMENT     Diagnosis Orders   1. History of colon polyps     2. BMI 36.0-36.9,adult     3. Family history of colon cancer         PLAN    We will proceed with screening colonoscopy in the coming weeks. Risks, benefits, alternatives thoroughly reviewed and accepted by Mr. Alonzo, including GI bleeding, perforation, missed lesions, COVID-19 exposure/infection, etc.     Henri Olivares MD

## 2021-07-06 ENCOUNTER — TELEPHONE (OUTPATIENT)
Dept: SURGERY | Age: 61
End: 2021-07-06

## 2021-07-06 ENCOUNTER — HOSPITAL ENCOUNTER (OUTPATIENT)
Age: 61
Discharge: HOME | End: 2021-07-06
Payer: COMMERCIAL

## 2021-07-06 LAB
EKG ATRIAL RATE: 88 BPM
EKG P AXIS: 47 DEGREES
EKG P-R INTERVAL: 168 MS
EKG Q-T INTERVAL: 346 MS
EKG QRS DURATION: 88 MS
EKG QTC CALCULATION (BAZETT): 418 MS
EKG R AXIS: 11 DEGREES
EKG T AXIS: 158 DEGREES
EKG VENTRICULAR RATE: 88 BPM

## 2021-07-06 PROCEDURE — 93005 ELECTROCARDIOGRAM TRACING: CPT | Performed by: SURGERY

## 2021-07-06 PROCEDURE — 93010 ELECTROCARDIOGRAM REPORT: CPT | Performed by: FAMILY MEDICINE

## 2021-07-19 ENCOUNTER — TELEPHONE (OUTPATIENT)
Dept: PRIMARY CARE CLINIC | Age: 61
End: 2021-07-19

## 2021-07-22 ENCOUNTER — OFFICE VISIT (OUTPATIENT)
Dept: PRIMARY CARE CLINIC | Age: 61
End: 2021-07-22
Payer: COMMERCIAL

## 2021-07-22 VITALS — HEART RATE: 84 BPM | RESPIRATION RATE: 16 BRPM | DIASTOLIC BLOOD PRESSURE: 88 MMHG | SYSTOLIC BLOOD PRESSURE: 152 MMHG

## 2021-07-22 DIAGNOSIS — Z79.4 TYPE 2 DIABETES MELLITUS WITH DIABETIC NEUROPATHY, WITH LONG-TERM CURRENT USE OF INSULIN (HCC): ICD-10-CM

## 2021-07-22 DIAGNOSIS — I10 ESSENTIAL HYPERTENSION: ICD-10-CM

## 2021-07-22 DIAGNOSIS — L08.9 TYPE 2 DIABETES MELLITUS WITH DIABETIC FOOT INFECTION (HCC): ICD-10-CM

## 2021-07-22 DIAGNOSIS — E11.40 TYPE 2 DIABETES MELLITUS WITH DIABETIC NEUROPATHY, WITH LONG-TERM CURRENT USE OF INSULIN (HCC): ICD-10-CM

## 2021-07-22 DIAGNOSIS — E11.628 TYPE 2 DIABETES MELLITUS WITH DIABETIC FOOT INFECTION (HCC): ICD-10-CM

## 2021-07-22 DIAGNOSIS — N17.9 ACUTE KIDNEY INJURY (NONTRAUMATIC) (HCC): Primary | ICD-10-CM

## 2021-07-22 LAB — HBA1C MFR BLD: 8 %

## 2021-07-22 PROCEDURE — 99214 OFFICE O/P EST MOD 30 MIN: CPT | Performed by: FAMILY MEDICINE

## 2021-07-22 PROCEDURE — 3052F HG A1C>EQUAL 8.0%<EQUAL 9.0%: CPT | Performed by: FAMILY MEDICINE

## 2021-07-22 PROCEDURE — 83036 HEMOGLOBIN GLYCOSYLATED A1C: CPT | Performed by: FAMILY MEDICINE

## 2021-07-22 RX ORDER — AMLODIPINE BESYLATE 5 MG/1
5 TABLET ORAL DAILY
Qty: 30 TABLET | Refills: 2 | Status: SHIPPED | OUTPATIENT
Start: 2021-07-22 | End: 2021-08-23 | Stop reason: SDUPTHER

## 2021-07-22 RX ORDER — HYDROCODONE BITARTRATE AND ACETAMINOPHEN 7.5; 325 MG/1; MG/1
TABLET ORAL
COMMUNITY
Start: 2021-07-18 | End: 2022-02-24

## 2021-07-22 RX ORDER — AMOXICILLIN AND CLAVULANATE POTASSIUM 500; 125 MG/1; MG/1
TABLET, FILM COATED ORAL
COMMUNITY
Start: 2021-07-18 | End: 2021-08-23

## 2021-07-22 RX ORDER — B COMPLEX, C NO.20/FOLIC ACID 1 MG
CAPSULE ORAL
COMMUNITY
Start: 2021-07-18 | End: 2021-08-23

## 2021-07-22 SDOH — ECONOMIC STABILITY: TRANSPORTATION INSECURITY
IN THE PAST 12 MONTHS, HAS LACK OF TRANSPORTATION KEPT YOU FROM MEETINGS, WORK, OR FROM GETTING THINGS NEEDED FOR DAILY LIVING?: NO

## 2021-07-22 SDOH — ECONOMIC STABILITY: FOOD INSECURITY: WITHIN THE PAST 12 MONTHS, YOU WORRIED THAT YOUR FOOD WOULD RUN OUT BEFORE YOU GOT MONEY TO BUY MORE.: NEVER TRUE

## 2021-07-22 SDOH — ECONOMIC STABILITY: FOOD INSECURITY: WITHIN THE PAST 12 MONTHS, THE FOOD YOU BOUGHT JUST DIDN'T LAST AND YOU DIDN'T HAVE MONEY TO GET MORE.: NEVER TRUE

## 2021-07-22 SDOH — ECONOMIC STABILITY: TRANSPORTATION INSECURITY
IN THE PAST 12 MONTHS, HAS THE LACK OF TRANSPORTATION KEPT YOU FROM MEDICAL APPOINTMENTS OR FROM GETTING MEDICATIONS?: NO

## 2021-07-22 ASSESSMENT — SOCIAL DETERMINANTS OF HEALTH (SDOH): HOW HARD IS IT FOR YOU TO PAY FOR THE VERY BASICS LIKE FOOD, HOUSING, MEDICAL CARE, AND HEATING?: NOT HARD AT ALL

## 2021-07-22 NOTE — LETTER
[facility]  [address] [place] [address]  Phone: [phone]  Fax: [phone]    Alvin Ruiz MD         July 22, 2021     Patient: Jazz Jennings   YOB: 1960   Date of Visit: 7/22/2021       To Whom It May Concern: It is my medical opinion that Xander Stagers requires a disability parking placard for the following reasons: using wheel chair due to inability to bear weight on rt foot    Duration of need: 3 months    If you have any questions or concerns, please don't hesitate to call.     Sincerely,        Alvin Ruiz MD

## 2021-07-22 NOTE — PATIENT INSTRUCTIONS
SURVEY:    You may be receiving a survey from [employer] regarding your visit today. You may get this in the mail, through your MyChart, or in your email. Please complete the survey to enable us to provide the highest quality of care to you and your family. If you cannot score us a very good (5 Stars) on any question, please call the office to discuss how we could of made your experience exceptional.    Thank you!     Dr. Gregory Dueñas, SUSANA Chirinos, [address] [place] [address] Abimael Green    Phone: [phone]  Fax: [phone]    Office Hours:   Seema Peacock, F: 8-5 Wednesday: 9-11

## 2021-07-22 NOTE — PROGRESS NOTES
Patient is here today for follow-up on hospitalization for getting metal in his foot, and it became infected. The IV antibiotics caused a decrease in kidney function. He began outpatient dialysis on Monday. Symptoms have-slightly improved. Current complaints-He states that he having some fatigue, and he also states that he is nausea and more gassy and has also had heartburn which he states makes him feel a little ill. He also states he is having minor pain in the foot. Medication change-Patient was put on Augmentin, and Norco. He also states that that they doubled his Insulin because his blood pressure had been running high. Follow-up with specialists-He goes to a podiatrist, Avni Hernandez, every week who repacks his dressing. He also states while he is at dialysis the kidney doctor swings by and checks in on him. Special needs-None. He had to cancel his colonoscopy due to being in the hospital. He was told to not take any aspirin due to the procedure so he hadn't been to prep for it. Allergies:  Patient has no known allergies.     Past Medical History:    Past Medical History:   Diagnosis Date    DM (diabetes mellitus) ([facility]     Varicose veins of both legs with edema     many years       Past Surgical History:    Past Surgical History:   Procedure Laterality Date    COLONOSCOPY N/A 7/16/2018    COLONOSCOPY POLYPECTOMY SNARE/COLD BIOPSY performed by Gallito Joseph MD at [address] [place] [address]  07/16/2018    -polyps(adenomatouspolyps,portions of adenomatous polyps and tubulovillous adenoma)    OTHER SURGICAL HISTORY      Laser surgery both eyes for diabetic retinopathy       Social History:   Social History     Tobacco Use    Smoking status: Never Smoker    Smokeless tobacco: Never Used   Substance Use Topics    Alcohol use: Yes     Comment: VERY RARE       Family History:   Family History   Problem Relation Age of Onset    Diabetes Mother     Emphysema Father     Diabetes Father     Colon Cancer Father          Review of Systems:  Constitutional: negative for fever or chills  Eyes: negative for visual disturbance   ENT: negative for sore throat or nasal congestion  Respiratory: negative for cough, shortness of breath and sputum  Cardiovascular: negative for chest pain,pnd,claudication or palpitations  Gastrointestinal: negative for abd pain, stella,nausea, vomiting, diarrhea or constipation  Genitourinary: negative for dysuria,,hematuria urgency or frequency  Musculoskeletal:negative for arthralgias, muscle weakness and stiff joints   Integument/breast: negative for skin rash or lesions  Neurological: positive for   numbness and tingling of fet. Psych: negative for anxiety, depression, suicidial ideation and suicidal attempt. Objective:  Physical Exam:  BP (!) 152/88   Pulse 84   Resp 16   GEN:   He is alert and oriented  ENT:    ENT exam normal, no neck nodes or sinus tenderness  NECK:   neck supple and non tender without mass, no thyromegaly or thyroid nodules, no cervical lymphadenopathy  EYES:   No gross abnormalities. CVS:     CVS exam BP noted to be well controlled today in office, S1, S2 normal, no gallop, no murmur, chest clear, no JVD, no HSM, no edema  PULM:   chest clear, no wheezing, rales, normal symmetric air entry, no tachypnea, retractions or cyanosis  ABD:   exam deferred  MUSC: has dressing rt foot   DTR diminished        Diagnostic Data:  Lab Results   Component Value Date    GLUCOSE 173 (H) 04/12/2021    LABA1C 8.0 07/22/2021    BUN 13 04/12/2021     04/12/2021    K 5.2 04/12/2021    CALCIUM 10.2 04/12/2021     04/12/2021    CO2 26 04/12/2021       Assessment:  1. Acute kidney injury (nontraumatic) ([address]    2. Type 2 diabetes mellitus with diabetic neuropathy, with long-term current use of insulin ([address]    3. Essential hypertension    4. Type 2 diabetes mellitus with diabetic foot infection ([facility]      Plan   Diagnosis Orders   1.  Acute kidney injury (nontraumatic) ([facility]  Continue dialysis and f/u with nephrology   2. Type 2 diabetes mellitus with diabetic neuropathy, with long-term current use of insulin ([facility])  hba1c 8,monitor blood sugars closely,follow diet and exercise,insulin coverage ac and hs   3. Essential hypertension  bp remains elevated. Add norvasc 5 mg daily   4. Type 2 diabetes mellitus with diabetic foot infection ([facility])  Continue augmentin and f/u with podiatry       · Check BS 3 times per day  · Medication change: add norvasc 5 mg daily  · Encouraged low fat, low sodium and diabetic, 1800 calorie diet. · Goal for blood pressure control is 130/80  · Recommended regular exercise as tolerated, 5 times per week  · Labs reviewed: cet3f-7  · Labs ordered: to have at dialysis  ascivd risk  Orders Placed This Encounter   Procedures    POCT glycosylated hemoglobin (Hb A1C)       Current Outpatient Medications   Medication Sig Dispense Refill    amLODIPine (NORVASC) 5 MG tablet Take 1 tablet by mouth daily 30 tablet 2    insulin lispro, 1 Unit Dial, (HUMALOG KWIKPEN) 100 UNIT/ML SOPN Inject twice a day as per coverage schedule FS <150 -no coverage 151-200 - 4 units 201-250 - 6 units 251-300 - 8 units 301- 350 - 10 units 351- 400 -12 units >401- call MD 1 pen 3    Insulin Syringe-Needle U-100 (RELION INSULIN SYRINGE 1ML/31G) 31G X 5/16\" 1 ML MISC Inject 1 each into the skin 2 times daily 100 each 3    Insulin Pen Needle (PEN NEEDLES) 31G X 5 MM MISC Inject 1 Units into the skin 2 times daily 100 each 1    Insulin NPH Isophane & Regular (HUMULIN;NOVOLIN) (70-30) 100 UNIT per ML injection pen Inject 60 Units into the skin 2 times daily 5 pen 3    glucose blood VI test strips (ASCENSIA AUTODISC VI;ONE TOUCH ULTRA TEST VI) strip 1 each by In Vitro route daily As needed.  100 each 3    INSULIN SYRINGE 1CC/29G (AIMSCO INSULIN SYR ULTRA THIN) 29G X 1/2\" 1 ML MISC 1 each by Does not apply route 2 times daily DX:E11.9 200 each 3    losartan (COZAAR) 25 MG tablet Take 2 tablets by mouth daily 90 tablet 0    amoxicillin-clavulanate (AUGMENTIN) 500-125 MG per tablet TAKE 1 TABLET BY MOUTH EVERY 12 HOURS      B Complex-C-Folic Acid (TRIPHROCAPS) 1 MG CAPS TAKE 1 CAPSULE BY MOUTH ONCE DAILY      HYDROcodone-acetaminophen (NORCO) 7.5-325 MG per tablet TAKE 1 TABLET BY MOUTH EVERY 4 TO 6 HOURS AS NEEDED FOR PAIN      pioglitazone (ACTOS) 30 MG tablet Take 1 tablet by mouth daily (Patient not taking: Reported on 5/7/2021) 90 tablet 2    metFORMIN (GLUCOPHAGE) 500 MG tablet Take 1 tablet by mouth 2 times daily (with meals) (Patient not taking: Reported on 5/7/2021) 180 tablet 3    aspirin 81 MG tablet Take 81 mg by mouth daily (Patient not taking: Reported on 7/22/2021)      atorvastatin (LIPITOR) 20 MG tablet Take 1 tablet by mouth daily (Patient not taking: Reported on 5/7/2021) 90 tablet 0     No current facility-administered medications for this visit. Return in about 1 month (around 8/22/2021).       Electronically signed by Yocasta Francis MD on 7/22/2021 at 11:21 AM

## 2021-08-06 ENCOUNTER — TELEPHONE (OUTPATIENT)
Dept: PRIMARY CARE CLINIC | Age: 61
End: 2021-08-06

## 2021-08-06 RX ORDER — LOSARTAN POTASSIUM 25 MG/1
50 TABLET ORAL DAILY
Qty: 180 TABLET | Refills: 0 | Status: SHIPPED | OUTPATIENT
Start: 2021-08-06

## 2021-08-06 NOTE — TELEPHONE ENCOUNTER
BP 1422/85 today, he is out of Losartan which was started while in the hospital. Do you want him to continue taking this?

## 2021-08-23 ENCOUNTER — OFFICE VISIT (OUTPATIENT)
Dept: PRIMARY CARE CLINIC | Age: 61
End: 2021-08-23
Payer: COMMERCIAL

## 2021-08-23 VITALS
SYSTOLIC BLOOD PRESSURE: 136 MMHG | DIASTOLIC BLOOD PRESSURE: 78 MMHG | RESPIRATION RATE: 16 BRPM | WEIGHT: 241.6 LBS | HEART RATE: 92 BPM | BODY MASS INDEX: 34.67 KG/M2

## 2021-08-23 DIAGNOSIS — E11.40 TYPE 2 DIABETES MELLITUS WITH DIABETIC NEUROPATHY, WITH LONG-TERM CURRENT USE OF INSULIN (HCC): Primary | ICD-10-CM

## 2021-08-23 DIAGNOSIS — I10 ESSENTIAL HYPERTENSION: ICD-10-CM

## 2021-08-23 DIAGNOSIS — Z79.4 TYPE 2 DIABETES MELLITUS WITH DIABETIC NEUROPATHY, WITH LONG-TERM CURRENT USE OF INSULIN (HCC): Primary | ICD-10-CM

## 2021-08-23 PROCEDURE — 99213 OFFICE O/P EST LOW 20 MIN: CPT | Performed by: FAMILY MEDICINE

## 2021-08-23 PROCEDURE — 3052F HG A1C>EQUAL 8.0%<EQUAL 9.0%: CPT | Performed by: FAMILY MEDICINE

## 2021-08-23 RX ORDER — FUROSEMIDE 40 MG/1
TABLET ORAL
COMMUNITY
Start: 2021-08-04

## 2021-08-23 RX ORDER — AMLODIPINE BESYLATE 5 MG/1
5 TABLET ORAL DAILY
Qty: 90 TABLET | Refills: 0 | Status: SHIPPED | OUTPATIENT
Start: 2021-08-23

## 2021-08-23 RX ORDER — CIPROFLOXACIN 250 MG/1
TABLET, FILM COATED ORAL
COMMUNITY
Start: 2021-08-20 | End: 2021-09-02 | Stop reason: ALTCHOICE

## 2021-08-23 NOTE — PATIENT INSTRUCTIONS
SURVEY:    You may be receiving a survey from [employer] regarding your visit today. You may get this in the mail, through your MyChart, or in your email. Please complete the survey to enable us to provide the highest quality of care to you and your family. If you cannot score us a very good (5 Stars) on any question, please call the office to discuss how we could of made your experience exceptional.    Thank you!     WESLEY Richardson RN   [facility], [address] [place] [address] Abimael Green    Phone: [phone]  Fax: [phone]    Office Hours:   Seema Portillo, F: 8-5 Wednesday: 9-11

## 2021-08-23 NOTE — PROGRESS NOTES
Aleena Manuel is a 62 y.o. male here for routine follow up of diabetes. He has been checking  his blood glucose levels regularly. His sugar this morning was 137, and yesterday was 154. He said these are in the morning fasting readings. He denies numbness and tingling in the hands and feet. He has been compliant with diet but not with exericse. He has been compliant with his medications. He is tolerating medications. Hypertension: Patient here for follow-up of elevated blood pressure. He is not exercising and is adherent to low salt diet. Patient does take blood pressure at home. He said that it is a little high, but it is coming down. He said it ranges between 140-85 area. Cardiac symptoms none. Patient denies chest pain, fatigue and palpitations. Cardiovascular risk factors: advanced age (older than 54 for men, 72 for women), diabetes mellitus, hypertension, male gender and obesity (BMI >= 30 kg/m2). Use of agents associated with hypertension: none. He was here last month for a hospital follow up for getting metal in his foot, and it became infected. The IV antibiotics caused a decrease in his kidney function. Due to this, he began outpatient dialysis. He was told on Friday that he no longer has to do dialysis. He said his kidney function is working again, and he gets the ports out on Wednesday. He also has a wound vac, he just began this on Friday and it gets changed 3 times a week. Allergies:  Patient has no known allergies.     Past Medical History:    Past Medical History:   Diagnosis Date    DM (diabetes mellitus) ([facility] [address]     Varicose veins of both legs with edema     many years       Past Surgical History:    Past Surgical History:   Procedure Laterality Date    COLONOSCOPY N/A 7/16/2018    COLONOSCOPY POLYPECTOMY SNARE/COLD BIOPSY performed by Nadege Swanson MD at [address]  07/16/2018    -polyps(adenomatouspolyps,portions of adenomatous polyps and tubulovillous adenoma)    OTHER SURGICAL HISTORY      Laser surgery both eyes for diabetic retinopathy       Social History:   Social History     Tobacco Use    Smoking status: Never Smoker    Smokeless tobacco: Never Used   Substance Use Topics    Alcohol use: Yes     Comment: VERY RARE       Family History:   Family History   Problem Relation Age of Onset    Diabetes Mother     Emphysema Father     Diabetes Father     Colon Cancer Father          Review of Systems:  Constitutional: negative for fever or chills  Eyes: negative for visual disturbance   ENT: negative for sore throat or nasal congestion  Respiratory: negative for cough, shortness of breath and sputum  Cardiovascular: negative for chest pain,pnd,claudication or palpitations  Gastrointestinal: negative for abd pain, stella,nausea, vomiting, diarrhea or constipation  Genitourinary: negative for dysuria,,hematuria urgency or frequency  Musculoskeletal:negative for arthralgias, muscle weakness and stiff joints   Integument/breast: has wound rt foot - on wound vac  Neurological: positive for   numbness and tingling of feet        Objective:  Physical Exam:  /78 (Site: Left Upper Arm, Position: Sitting)   Pulse 92   Resp 16   Wt 241 lb 9.6 oz (109.6 kg) Comment: HAS BOOT ON FOOT  BMI 34.67 kg/m²   GEN:   He is alert and oriented  ENT:    ENT exam normal, no neck nodes or sinus tenderness  NECK:   neck supple and non tender without mass, no thyromegaly or thyroid nodules, no cervical lymphadenopathy  EYES:   No gross abnormalities.   CVS:     CVS exam BP noted to be well controlled today in office, S1, S2 normal, no gallop, no murmur, chest clear, no JVD, no HSM, no edema  PULM:   chest clear, no wheezing, rales, normal symmetric air entry  ABD:   exam deferred  Skin : has wound vac rt foot          Diagnostic Data:  Lab Results   Component Value Date    GLUCOSE 173 (H) 04/12/2021    LABA1C 8.0 07/22/2021    BUN 13 04/12/2021     04/12/2021    K 5.2 04/12/2021    CALCIUM 10.2 04/12/2021     04/12/2021    CO2 26 04/12/2021       Assessment:  1. Type 2 diabetes mellitus with diabetic neuropathy, with long-term current use of insulin ([address]    2. Essential hypertension      Plan   Diagnosis Orders   1. Type 2 diabetes mellitus with diabetic neuropathy, with long-term current use of insulin ([facility])  Improving,continue close monitoring   2. Essential hypertension  Improved with addition of norvasc       · Check BS 2 times per day  · Medication change: continue norvasc for better control of htn  · Encouraged low fat, low sodium and diabetic, 1800 calorie diet. · Goal for blood pressure control is 120/80  · Recommended regular exercise as tolerated, 5 times per week  ascivd risk  No orders of the defined types were placed in this encounter. Current Outpatient Medications   Medication Sig Dispense Refill    furosemide (LASIX) 40 MG tablet TAKE 1 TABLET BY MOUTH ONCE DAILY      amLODIPine (NORVASC) 5 MG tablet Take 1 tablet by mouth daily 90 tablet 0    losartan (COZAAR) 25 MG tablet Take 2 tablets by mouth daily 180 tablet 0    insulin lispro, 1 Unit Dial, (HUMALOG KWIKPEN) 100 UNIT/ML SOPN Inject twice a day as per coverage schedule FS <150 -no coverage 151-200 - 4 units 201-250 - 6 units 251-300 - 8 units 301- 350 - 10 units 351- 400 -12 units >401- call MD 1 pen 3    Insulin Syringe-Needle U-100 (RELION INSULIN SYRINGE 1ML/31G) 31G X 5/16\" 1 ML MISC Inject 1 each into the skin 2 times daily 100 each 3    Insulin Pen Needle (PEN NEEDLES) 31G X 5 MM MISC Inject 1 Units into the skin 2 times daily 100 each 1    Insulin NPH Isophane & Regular (HUMULIN;NOVOLIN) (70-30) 100 UNIT per ML injection pen Inject 60 Units into the skin 2 times daily 5 pen 3    glucose blood VI test strips (ASCENSIA AUTODISC VI;ONE TOUCH ULTRA TEST VI) strip 1 each by In Vitro route daily As needed.  100 each 3    INSULIN SYRINGE 1CC/29G (AIMSCO INSULIN SYR ULTRA THIN) 29G X 1/2\" 1 ML MISC 1 each by Does not apply route 2 times daily DX:E11.9 200 each 3    ciprofloxacin (CIPRO) 250 MG tablet TAKE 1 TABLET BY MOUTH EVERY 24 HOURS      HYDROcodone-acetaminophen (NORCO) 7.5-325 MG per tablet TAKE 1 TABLET BY MOUTH EVERY 4 TO 6 HOURS AS NEEDED FOR PAIN      pioglitazone (ACTOS) 30 MG tablet Take 1 tablet by mouth daily (Patient not taking: Reported on 5/7/2021) 90 tablet 2    metFORMIN (GLUCOPHAGE) 500 MG tablet Take 1 tablet by mouth 2 times daily (with meals) (Patient not taking: Reported on 5/7/2021) 180 tablet 3    aspirin 81 MG tablet Take 81 mg by mouth daily (Patient not taking: Reported on 7/22/2021)      atorvastatin (LIPITOR) 20 MG tablet Take 1 tablet by mouth daily (Patient not taking: Reported on 5/7/2021) 90 tablet 0     No current facility-administered medications for this visit. No follow-ups on file.       Electronically signed by Whitley Culver MD on 8/23/2021 at 9:51 AM

## 2021-09-02 ENCOUNTER — HOSPITAL ENCOUNTER (OUTPATIENT)
Age: 61
Discharge: HOME | End: 2021-09-02
Payer: COMMERCIAL

## 2021-09-02 ENCOUNTER — OFFICE VISIT (OUTPATIENT)
Dept: PRIMARY CARE CLINIC | Age: 61
End: 2021-09-02
Payer: COMMERCIAL

## 2021-09-02 VITALS
TEMPERATURE: 97.5 F | HEART RATE: 92 BPM | DIASTOLIC BLOOD PRESSURE: 70 MMHG | SYSTOLIC BLOOD PRESSURE: 118 MMHG | WEIGHT: 240.3 LBS | BODY MASS INDEX: 34.48 KG/M2

## 2021-09-02 DIAGNOSIS — E08.42 DIABETIC POLYNEUROPATHY ASSOCIATED WITH DIABETES MELLITUS DUE TO UNDERLYING CONDITION (HCC): ICD-10-CM

## 2021-09-02 DIAGNOSIS — Z23 NEED FOR INFLUENZA VACCINATION: ICD-10-CM

## 2021-09-02 DIAGNOSIS — L08.9 SKIN INFECTION: Primary | ICD-10-CM

## 2021-09-02 LAB
ANION GAP SERPL CALCULATED.3IONS-SCNC: 13 MMOL/L (ref 9–17)
BUN BLDV-MCNC: 28 MG/DL (ref 8–23)
BUN/CREAT BLD: 24 (ref 9–20)
CALCIUM SERPL-MCNC: 9.7 MG/DL (ref 8.6–10.4)
CHLORIDE BLD-SCNC: 100 MMOL/L (ref 98–107)
CO2: 22 MMOL/L (ref 20–31)
CREAT SERPL-MCNC: 1.15 MG/DL (ref 0.7–1.2)
GFR AFRICAN AMERICAN: >60 ML/MIN
GFR NON-AFRICAN AMERICAN: >60 ML/MIN
GFR SERPL CREATININE-BSD FRML MDRD: ABNORMAL ML/MIN/{1.73_M2}
GFR SERPL CREATININE-BSD FRML MDRD: ABNORMAL ML/MIN/{1.73_M2}
GLUCOSE BLD-MCNC: 198 MG/DL (ref 70–99)
POTASSIUM SERPL-SCNC: 4.6 MMOL/L (ref 3.7–5.3)
SODIUM BLD-SCNC: 135 MMOL/L (ref 135–144)

## 2021-09-02 PROCEDURE — 80048 BASIC METABOLIC PNL TOTAL CA: CPT

## 2021-09-02 PROCEDURE — 99214 OFFICE O/P EST MOD 30 MIN: CPT | Performed by: FAMILY MEDICINE

## 2021-09-02 PROCEDURE — 90674 CCIIV4 VAC NO PRSV 0.5 ML IM: CPT | Performed by: FAMILY MEDICINE

## 2021-09-02 PROCEDURE — 90471 IMMUNIZATION ADMIN: CPT | Performed by: FAMILY MEDICINE

## 2021-09-02 PROCEDURE — 36415 COLL VENOUS BLD VENIPUNCTURE: CPT

## 2021-09-02 RX ORDER — GABAPENTIN 100 MG/1
100 CAPSULE ORAL 3 TIMES DAILY
Qty: 90 CAPSULE | Refills: 0 | Status: SHIPPED | OUTPATIENT
Start: 2021-09-02 | End: 2021-11-23

## 2021-09-02 RX ORDER — DOXYCYCLINE HYCLATE 100 MG
100 TABLET ORAL 2 TIMES DAILY
Qty: 20 TABLET | Refills: 0 | Status: SHIPPED | OUTPATIENT
Start: 2021-09-02 | End: 2021-09-12

## 2021-09-02 ASSESSMENT — PATIENT HEALTH QUESTIONNAIRE - PHQ9
SUM OF ALL RESPONSES TO PHQ9 QUESTIONS 1 & 2: 2
SUM OF ALL RESPONSES TO PHQ QUESTIONS 1-9: 2
2. FEELING DOWN, DEPRESSED OR HOPELESS: 2
SUM OF ALL RESPONSES TO PHQ QUESTIONS 1-9: 2
SUM OF ALL RESPONSES TO PHQ QUESTIONS 1-9: 2
1. LITTLE INTEREST OR PLEASURE IN DOING THINGS: 0

## 2021-09-02 NOTE — PROGRESS NOTES
Patient is here with complaints of itch, burn, and pain in torso area. Patient states this started three days ago. Patient states it's has itchiness and then feels like a bee sting and says it feels intense in certain spots. Patient states his back or says even to touch or scratch it transforms into tense pain and says it works around his body. Patient states last night he probably had 30 minutes of it and it faded away and says he was able to go to sleep. Patent states it kind of comes in waves at different times. Patient states he would like  to check where his port was. Patient states it has a little bit of a lump under the skin. Patient states it doesn't really feel warm and isn't red. Patient states he had it removed on August 23rd. CURRENT ALLERGIES: Patient has no known allergies. PAST MEDICAL HISTORY:   Past Medical History:   Diagnosis Date    DM (diabetes mellitus) ([facility] [address]     Varicose veins of both legs with edema     many years       SURGICAL HISTORY:   Past Surgical History:   Procedure Laterality Date    COLONOSCOPY N/A 7/16/2018    COLONOSCOPY POLYPECTOMY SNARE/COLD BIOPSY performed by Rowe Fabry, MD at [address] [place] [address]  07/16/2018    -polyps(adenomatouspolyps,portions of adenomatous polyps and tubulovillous adenoma)    OTHER SURGICAL HISTORY      Laser surgery both eyes for diabetic retinopathy       FAMILY HISTORY:   Family History   Problem Relation Age of Onset    Diabetes Mother     Emphysema Father     Diabetes Father     Colon Cancer Father        SOCIAL HISTORY:   Social History     Tobacco Use    Smoking status: Never Smoker    Smokeless tobacco: Never Used   Substance Use Topics    Alcohol use: Yes     Comment: VERY RARE    Drug use: No     Prior to Admission medications    Medication Sig Start Date End Date Taking?  Authorizing Provider   furosemide (LASIX) 40 MG tablet TAKE 1 TABLET BY MOUTH ONCE DAILY 8/4/21  Yes Historical Provider, MD amLODIPine (NORVASC) 5 MG tablet Take 1 tablet by mouth daily 8/23/21  Yes Chiquis Mahan MD   losartan (COZAAR) 25 MG tablet Take 2 tablets by mouth daily 8/6/21  Yes Chiquis Mahan MD   HYDROcodone-acetaminophen (Huey Ran) 7.5-325 MG per tablet TAKE 1 TABLET BY MOUTH EVERY 4 TO 6 HOURS AS NEEDED FOR PAIN 7/18/21  Yes Historical Provider, MD   insulin lispro, 1 Unit Dial, (HUMALOG KWIKPEN) 100 UNIT/ML SOPN Inject twice a day as per coverage schedule FS <150 -no coverage 151-200 - 4 units 201-250 - 6 units 251-300 - 8 units 301- 350 - 10 units 351- 400 -12 units >401- call MD 4/12/21  Yes Chiquis Mahan MD   Insulin Syringe-Needle U-100 (RELION INSULIN SYRINGE 1ML/31G) 31G X 5/16\" 1 ML MISC Inject 1 each into the skin 2 times daily 4/6/21  Yes Chiquis Mahan MD   Insulin Pen Needle (PEN NEEDLES) 31G X 5 MM MISC Inject 1 Units into the skin 2 times daily 1/17/20  Yes Chiquis Mahan MD   Insulin NPH Isophane & Regular (HUMULIN;NOVOLIN) (70-30) 100 UNIT per ML injection pen Inject 60 Units into the skin 2 times daily 1/21/19  Yes Chiquis Mahan MD   glucose blood VI test strips (ASCENSIA AUTODISC VI;ONE TOUCH ULTRA TEST VI) strip 1 each by In Vitro route daily As needed.  10/20/17  Yes Chiquis Mahan MD   INSULIN SYRINGE 1CC/29G (AIMSCO INSULIN SYR ULTRA THIN) 29G X 1/2\" 1 ML MISC 1 each by Does not apply route 2 times daily DX:E11.9 3/8/17  Yes Chiquis Mahan MD   pioglitazone (ACTOS) 30 MG tablet Take 1 tablet by mouth daily  Patient not taking: Reported on 5/7/2021 4/11/19   Chiquis Mahan MD   metFORMIN (GLUCOPHAGE) 500 MG tablet Take 1 tablet by mouth 2 times daily (with meals)  Patient not taking: Reported on 5/7/2021 1/21/19   Chiquis Mahan MD   aspirin 81 MG tablet Take 81 mg by mouth daily  Patient not taking: Reported on 7/22/2021    Historical Provider, MD   atorvastatin (LIPITOR) 20 MG tablet Take 1 tablet by mouth daily  Patient not taking: Reported on 5/7/2021 8/8/16 on 9/2/2021 at 9:32 AM         Jasson Canada MD, MD

## 2021-09-02 NOTE — PATIENT INSTRUCTIONS
SURVEY:    You may be receiving a survey from [employer] regarding your visit today. You may get this in the mail, through your MyChart, or in your email. Please complete the survey to enable us to provide the highest quality of care to you and your family. If you cannot score us a very good (5 Stars) on any question, please call the office to discuss how we could of made your experience exceptional.    Thank you!     Dr. Gregary Lanes, LPN Fae Ferretti, [facility] [place]te, [address] [place] [address] [place]    Phone: [phone]  Fax: [phone]    Office Hours:   Seema Morin, F: 8-5 Wednesday: 9-11

## 2021-09-16 ENCOUNTER — HOSPITAL ENCOUNTER (OUTPATIENT)
Age: 61
Discharge: HOME | End: 2021-09-16
Payer: COMMERCIAL

## 2021-09-16 LAB
-: ABNORMAL
ALBUMIN SERPL-MCNC: 4.1 G/DL (ref 3.5–5.2)
AMORPHOUS: ABNORMAL
ANION GAP SERPL CALCULATED.3IONS-SCNC: 10 MMOL/L (ref 9–17)
BACTERIA: ABNORMAL
BILIRUBIN URINE: NEGATIVE
BUN BLDV-MCNC: 22 MG/DL (ref 8–23)
BUN/CREAT BLD: 22 (ref 9–20)
CALCIUM SERPL-MCNC: 9.4 MG/DL (ref 8.6–10.4)
CASTS UA: ABNORMAL /LPF
CHLORIDE BLD-SCNC: 99 MMOL/L (ref 98–107)
CO2: 23 MMOL/L (ref 20–31)
COLOR: YELLOW
COMMENT UA: ABNORMAL
CREAT SERPL-MCNC: 1.01 MG/DL (ref 0.7–1.2)
CREATININE URINE: 102.5 MG/DL (ref 39–259)
CRYSTALS, UA: ABNORMAL /HPF
EPITHELIAL CELLS UA: ABNORMAL /HPF (ref 0–5)
GFR AFRICAN AMERICAN: >60 ML/MIN
GFR NON-AFRICAN AMERICAN: >60 ML/MIN
GFR SERPL CREATININE-BSD FRML MDRD: ABNORMAL ML/MIN/{1.73_M2}
GFR SERPL CREATININE-BSD FRML MDRD: ABNORMAL ML/MIN/{1.73_M2}
GLUCOSE BLD-MCNC: 229 MG/DL (ref 70–99)
GLUCOSE URINE: ABNORMAL
HCT VFR BLD CALC: 34.5 % (ref 40.7–50.3)
HEMOGLOBIN: 11.2 G/DL (ref 13–17)
KETONES, URINE: NEGATIVE
LEUKOCYTE ESTERASE, URINE: NEGATIVE
MCH RBC QN AUTO: 29.2 PG (ref 25.2–33.5)
MCHC RBC AUTO-ENTMCNC: 32.5 G/DL (ref 28.4–34.8)
MCV RBC AUTO: 89.8 FL (ref 82.6–102.9)
MUCUS: ABNORMAL
NITRITE, URINE: NEGATIVE
NRBC AUTOMATED: 0 PER 100 WBC
OTHER OBSERVATIONS UA: ABNORMAL
PDW BLD-RTO: 13.8 % (ref 11.8–14.4)
PH UA: 5.5 (ref 5–9)
PHOSPHORUS: 3.5 MG/DL (ref 2.5–4.5)
PLATELET # BLD: 310 K/UL (ref 138–453)
PMV BLD AUTO: 11.3 FL (ref 8.1–13.5)
POTASSIUM SERPL-SCNC: 4.4 MMOL/L (ref 3.7–5.3)
PROTEIN UA: NEGATIVE
RBC # BLD: 3.84 M/UL (ref 4.21–5.77)
RBC UA: ABNORMAL /HPF (ref 0–2)
RENAL EPITHELIAL, UA: ABNORMAL /HPF
SODIUM BLD-SCNC: 132 MMOL/L (ref 135–144)
SPECIFIC GRAVITY UA: 1.02 (ref 1.01–1.02)
TOTAL PROTEIN, URINE: 6 MG/DL
TRICHOMONAS: ABNORMAL
TURBIDITY: CLEAR
URINE HGB: NEGATIVE
UROBILINOGEN, URINE: NORMAL
WBC # BLD: 9 K/UL (ref 3.5–11.3)
WBC UA: ABNORMAL /HPF (ref 0–5)
YEAST: ABNORMAL

## 2021-09-16 PROCEDURE — 82570 ASSAY OF URINE CREATININE: CPT

## 2021-09-16 PROCEDURE — 36415 COLL VENOUS BLD VENIPUNCTURE: CPT

## 2021-09-16 PROCEDURE — 85027 COMPLETE CBC AUTOMATED: CPT

## 2021-09-16 PROCEDURE — 80069 RENAL FUNCTION PANEL: CPT

## 2021-09-16 PROCEDURE — 81001 URINALYSIS AUTO W/SCOPE: CPT

## 2021-09-16 PROCEDURE — 84156 ASSAY OF PROTEIN URINE: CPT

## 2021-11-22 RX ORDER — PEN NEEDLE, DIABETIC 30 GX3/16"
1 NEEDLE, DISPOSABLE MISCELLANEOUS 2 TIMES DAILY
Qty: 100 EACH | Refills: 1 | Status: SHIPPED | OUTPATIENT
Start: 2021-11-22 | End: 2021-11-23 | Stop reason: SDUPTHER

## 2021-11-23 ENCOUNTER — OFFICE VISIT (OUTPATIENT)
Dept: PRIMARY CARE CLINIC | Age: 61
End: 2021-11-23
Payer: COMMERCIAL

## 2021-11-23 VITALS
HEART RATE: 78 BPM | WEIGHT: 255.8 LBS | SYSTOLIC BLOOD PRESSURE: 155 MMHG | DIASTOLIC BLOOD PRESSURE: 78 MMHG | BODY MASS INDEX: 36.7 KG/M2

## 2021-11-23 DIAGNOSIS — I10 ESSENTIAL HYPERTENSION: ICD-10-CM

## 2021-11-23 DIAGNOSIS — Z79.4 TYPE 2 DIABETES MELLITUS WITH DIABETIC NEUROPATHY, WITH LONG-TERM CURRENT USE OF INSULIN (HCC): Primary | ICD-10-CM

## 2021-11-23 DIAGNOSIS — E11.40 TYPE 2 DIABETES MELLITUS WITH DIABETIC NEUROPATHY, WITH LONG-TERM CURRENT USE OF INSULIN (HCC): Primary | ICD-10-CM

## 2021-11-23 DIAGNOSIS — E78.5 DYSLIPIDEMIA: ICD-10-CM

## 2021-11-23 DIAGNOSIS — E66.9 OBESITY (BMI 30-39.9): ICD-10-CM

## 2021-11-23 LAB — HBA1C MFR BLD: 7.9 %

## 2021-11-23 PROCEDURE — 99214 OFFICE O/P EST MOD 30 MIN: CPT | Performed by: STUDENT IN AN ORGANIZED HEALTH CARE EDUCATION/TRAINING PROGRAM

## 2021-11-23 PROCEDURE — 3051F HG A1C>EQUAL 7.0%<8.0%: CPT | Performed by: STUDENT IN AN ORGANIZED HEALTH CARE EDUCATION/TRAINING PROGRAM

## 2021-11-23 PROCEDURE — 83036 HEMOGLOBIN GLYCOSYLATED A1C: CPT | Performed by: STUDENT IN AN ORGANIZED HEALTH CARE EDUCATION/TRAINING PROGRAM

## 2021-11-23 RX ORDER — PEN NEEDLE, DIABETIC 30 GX3/16"
1 NEEDLE, DISPOSABLE MISCELLANEOUS 2 TIMES DAILY
Qty: 100 EACH | Refills: 5 | Status: SHIPPED | OUTPATIENT
Start: 2021-11-23

## 2021-11-23 ASSESSMENT — ENCOUNTER SYMPTOMS
NAUSEA: 0
SINUS PRESSURE: 0
EYE REDNESS: 0
WHEEZING: 0
EYE DISCHARGE: 0
COLOR CHANGE: 0
EYE PAIN: 0
TROUBLE SWALLOWING: 0
VOMITING: 0
PHOTOPHOBIA: 0
ABDOMINAL PAIN: 0
SHORTNESS OF BREATH: 0
SORE THROAT: 0
DIARRHEA: 0
EYE ITCHING: 0
CHOKING: 0
CHEST TIGHTNESS: 0
SINUS PAIN: 0
COUGH: 0
BACK PAIN: 0
APNEA: 0

## 2021-11-23 ASSESSMENT — PATIENT HEALTH QUESTIONNAIRE - PHQ9
2. FEELING DOWN, DEPRESSED OR HOPELESS: 1
1. LITTLE INTEREST OR PLEASURE IN DOING THINGS: 1
SUM OF ALL RESPONSES TO PHQ9 QUESTIONS 1 & 2: 2
SUM OF ALL RESPONSES TO PHQ QUESTIONS 1-9: 2

## 2021-11-23 NOTE — PROGRESS NOTES
[address], [address] , [facility], Jennifer Boss   1960 is a 62 y.o. male, Established patient, here for evaluation of the following chief complaint(s):    Diabetes and Hypertension    ASSESSMENT/PLAN:    1. Type 2 diabetes mellitus with diabetic neuropathy, with long-term current use of insulin (HCC)  -     POCT glycosylated hemoglobin (Hb A1C)  2. Essential hypertension  3. Dyslipidemia  4. Obesity (BMI 30-39. 9)     Type 2 diabetes is not currently at goal and I discussed various alternatives and additions to his current treatment. Patient also bring a logbook with him during the next appointment. I discussed that meeting with diabetic education would be beneficial. I provided the patient with a list of medications that he could be started on given his history of CKD as well as diabetes on insulin. He will also discuss this with his Nephrologist. I also discussed that we could switch his NPH to a long-acting insulin. I discussed the risk benefits and alternatives of these and the patient would like to continue with his current management plan at this time. Plan to recheck HBA1c in 3 months' time. The patient will resume and start taking his medication as prescribed for HTN and Dyslipidemia. BP Check in 2 week's time. I encouraged and discussed lifestyle modifications including diet and exercise and the patient was agreeable to making positive/beneficial changes to both to help improve their overall health. Patient states that usually in the summertime he loses more weight than in the winter, plans on making some lifestyle changes in order to lose weight.     Medications Discontinued During This Encounter   Medication Reason    gabapentin (NEURONTIN) 100 MG capsule LIST CLEANUP    glucose blood VI test strips (ASCENSIA AUTODISC VI;ONE TOUCH ULTRA TEST VI) strip LIST CLEANUP    metFORMIN (GLUCOPHAGE) 500 MG tablet LIST CLEANUP    Insulin NPH Isophane & Regular (HUMULIN;NOVOLIN) (70-30) 100 UNIT per ML injection pen     Insulin Pen Needle (PEN NEEDLES) 31G X 5 MM MISC REORDER        Return in about 3 months (around 2/23/2022) for Diabetes. [address] received counseling on the following healthy behaviors: nutrition, exercise and medication adherence. I encouraged and discussed lifestyle modifications including diet and exercise and the patient was agreeable to making positive/beneficial changes to both to help improve their overall health. Discussed use, benefit, and side effects of prescribed medications. Barriers to medication compliance addressed. Patient given educational materials: see patient instructions. HM - HM items completed today as per orders. Outstanding HM items though not limited to immunizations were discussed with the patient today, including risks, benefits and alternatives. The patient will discuss these during the next appointment per their preference. If there are any worsening or concerning signs or symptoms, patient will report to the ED and/or contact [phone] for immediate evaluation. Teach back method was used. All patient questions answered. Pt voiced understanding. Subjective    SUBJECTIVE/OBJECTIVE:  HPI   Diabetes and Hypertension  Hypertension: Patient here for follow-up of elevated blood pressure. He patient states as much as he can exercising and is adherent to low salt diet. Blood pressure is well controlled at home. Cardiac symptoms fatigue and intermittent palpitations. Patient denies chest pain, pressure, discomfort. Cardiovascular risk factors: advanced age (older than 54 for men, 72 for women), diabetes mellitus, hypertension, male gender and obesity (BMI >= 30 kg/m2). Use of agents associated with hypertension: none. Patient states from the wrist up his b/p yesterday was 154/85. He has not been taking his amlodipine or losartan. No prior SHANE testing.     Hyperlipidemia:  No new myalgias or GI upset on atorvastatin (Lipitor). Medication compliance: noncompliant. Patient is not following a low fat, low cholesterol diet. He is not exercising regularly. Lab Results   Component Value Date    CHOL 190 04/12/2021    TRIG 185 (H) 04/12/2021    HDL 42 04/12/2021     Lab Results   Component Value Date    ALT 44 (H) 04/12/2021    AST 24 04/12/2021        The 10-year ASCVD risk score (Rhonda Pittman, et al., 2013) is: 26.9%    Values used to calculate the score:      Age: 62 years      Sex: Male      Is Non- : No      Diabetic: Yes      Tobacco smoker: No      Systolic Blood Pressure: 708 mmHg      Is BP treated: Yes      HDL Cholesterol: 42 mg/dL      Total Cholesterol: 190 mg/dL    T2DM  Follow-up of Type 2 diabetes mellitus. Meds - insulin Humalog and Actos, he is no longer taking metformin. Compliance - good compliance with insulin and Actos  Patient has been doing 50U BID Humalog  Home blood sugar records: fasting range: 117-120s  Any episodes of hypoglycemia? yes - 1 earlier today, at [place] [address], he ate some cereal with milk. He had a few episodes in the past and titrated himself down from 60U to 50U BID. He sometimes gets dizziness, lightheadedness intermittently with the sugars. Fluctuating blood sugars? yes -   Weight trend: increasing steadily  Current diet: in general, a \"healthy\" diet    Current exercise: none  Known diabetic complications: nephropathy, peripheral neuropathy and peripheral vascular disease    Hgb A1c -   Lab Results   Component Value Date    LABA1C 7.9 11/23/2021     BP -   BP Readings from Last 1 Encounters:   11/23/21 (!) 155/78     Lipid Panel -   Lab Results   Component Value Date    HDL 42 04/12/2021    TRIG 185 04/12/2021   Is He on Statin? Yes  Is He on ACE inhibitor or angiotensin II receptor blocker? Yes  Is He on Aspirin? Yes  Eye exam current (within one year): yes  Foot exam current (within one year): yes - patient had cellulitis and s/p amputation.   Is He Smoker? No  Patient is also following w/ Nephrology in [facility]  Patient states he is seeing his podiatrist every week for s/p cellulitis and amputation. BMI, Obesity 36.70  Virginie Burch is a 62 y.o. male here for discussion regarding weight loss. He has noted a weight gain of approximately 35 pounds over the last several years. He feels ideal weight is 220 pounds. Weight at graduation from high school was 220 pounds. History of eating disorders: none. Previous treatments for obesity include self-directed dieting. Obesity associated medical conditions: diabetes mellitus and hypertension. Obesity associated medications: none. Cardiovascular risk factors besides obesity: advanced age (older than 54 for men, 72 for women), diabetes mellitus, dyslipidemia, family history of premature cardiovascular disease, hypertension, male gender, obesity (BMI >= 30 kg/m2) and sedentary lifestyle.     Family History   Problem Relation Age of Onset    Diabetes Mother     Emphysema Father     Diabetes Father     Colon Cancer Father      Health Maintenance   Alcohol/Substance use History - None  Smoking History    Tobacco Use      Smoking status: Never Smoker      Smokeless tobacco: Never Used    Health Maintenance   Topic Date Due    Pneumococcal 0-64 years Vaccine (1 of 2 - PPSV23) Never done    Shingles Vaccine (1 of 2) Never done    Diabetic microalbuminuria test  07/10/2019    Diabetic retinal exam  01/24/2021    Colon cancer screen colonoscopy  07/16/2021    COVID-19 Vaccine (3 - Booster for Pfizer series) 10/15/2021    Hepatitis C screen  11/15/2026 (Originally 1960)    HIV screen  11/15/2026 (Originally 8/25/1975)    Diabetic foot exam  04/12/2022    Lipid screen  04/12/2022    Potassium monitoring  09/16/2022    Creatinine monitoring  09/16/2022    A1C test (Diabetic or Prediabetic)  11/23/2022    DTaP/Tdap/Td vaccine (2 - Td or Tdap) 07/19/2027    Flu vaccine  Completed    Hepatitis A vaccine Aged Out    Hib vaccine  Aged Out    Meningococcal (ACWY) vaccine  Aged Out      Depression Screening  PHQ Scores 11/23/2021 9/2/2021 4/12/2021 4/17/2020 4/11/2019 1/26/2018   PHQ2 Score 2 2 2 0 0 0   PHQ9 Score 2 2 2 0 0 0     Interpretation of Total Score Depression Severity: 1-4 = Minimal depression, 5-9 = Mild depression, 10-14 = Moderate depression, 15-19 = Moderately severe depression, 20-27 = Severe depression    Review of Systems   Constitutional: Negative for activity change, appetite change, chills, diaphoresis, fatigue, fever and unexpected weight change. HENT: Negative for congestion, sinus pressure, sinus pain, sore throat and trouble swallowing. Eyes: Negative for photophobia, pain, discharge, redness, itching and visual disturbance. Respiratory: Negative for apnea, cough, choking, chest tightness, shortness of breath and wheezing. Cardiovascular: Negative for chest pain, palpitations and leg swelling. Gastrointestinal: Negative for abdominal pain, diarrhea, nausea and vomiting. Endocrine: Negative for cold intolerance, heat intolerance, polydipsia, polyphagia and polyuria. Genitourinary: Negative for difficulty urinating, flank pain and frequency. Musculoskeletal: Positive for gait problem (s/p cellulitis/diabetic toe amputation). Negative for arthralgias, back pain, joint swelling, myalgias, neck pain and neck stiffness. Skin: Negative for color change, pallor, rash and wound. Allergic/Immunologic: Negative for environmental allergies and food allergies. Neurological: Positive for dizziness. Negative for seizures, speech difficulty, light-headedness, numbness and headaches. Psychiatric/Behavioral: Negative for confusion, sleep disturbance and suicidal ideas. The patient is not nervous/anxious. Objective    Physical Exam  Vitals reviewed. Constitutional:       General: He is not in acute distress. Appearance: Normal appearance. He is well-developed.  He is obese. He is not toxic-appearing or diaphoretic. HENT:      Head: Normocephalic and atraumatic. Nose: Nose normal. No congestion or rhinorrhea. Mouth/Throat:      Mouth: Mucous membranes are moist.      Pharynx: No oropharyngeal exudate or posterior oropharyngeal erythema. Eyes:      General: No scleral icterus. Right eye: No discharge. Left eye: No discharge. Extraocular Movements: Extraocular movements intact. Conjunctiva/sclera: Conjunctivae normal.      Pupils: Pupils are equal, round, and reactive to light. Cardiovascular:      Rate and Rhythm: Normal rate and regular rhythm. Pulses: Normal pulses. Heart sounds: Normal heart sounds. No murmur heard. Pulmonary:      Effort: Pulmonary effort is normal. No respiratory distress. Breath sounds: Normal breath sounds. No stridor. No wheezing or rhonchi. Abdominal:      General: Bowel sounds are normal. There is no distension. Palpations: Abdomen is soft. There is no mass. Tenderness: There is no abdominal tenderness. There is no right CVA tenderness or left CVA tenderness. Hernia: No hernia is present. Musculoskeletal:         General: No swelling, tenderness, deformity or signs of injury. Normal range of motion. Cervical back: Normal range of motion and neck supple. No rigidity or tenderness. Comments: Walking boot is in place     Lymphadenopathy:      Cervical: No cervical adenopathy. Skin:     General: Skin is warm and dry. Capillary Refill: Capillary refill takes less than 2 seconds. Coloration: Skin is not jaundiced or pale. Findings: No bruising, erythema, lesion or rash. Neurological:      General: No focal deficit present. Mental Status: He is alert and oriented to person, place, and time. Cranial Nerves: No cranial nerve deficit. Sensory: No sensory deficit. Motor: No weakness.       Coordination: Coordination normal.      Gait: Gait normal.      Deep Tendon Reflexes: Reflexes normal.   Psychiatric:         Mood and Affect: Mood normal.         Speech: Speech normal.         Behavior: Behavior normal.         Thought Content: Thought content normal.         Judgment: Judgment normal.        BP (!) 155/78 (Site: Left Upper Arm, Position: Sitting) Comment: recheck  Pulse 78   Wt 255 lb 12.8 oz (116 kg)   BMI 36.70 kg/m²   BP Readings from Last 3 Encounters:   21 (!) 155/78   21 118/70   21 136/78     Lab Results   Component Value Date    WBC 9.0 2021    HGB 11.2 (L) 2021    HCT 34.5 (L) 2021     2021    CHOL 190 2021    TRIG 185 (H) 2021    HDL 42 2021    ALT 44 (H) 2021    AST 24 2021     (L) 2021    K 4.4 2021    CL 99 2021    CREATININE 1.01 2021    BUN 22 2021    CO2 23 2021    PSA 0.89 2021    LABA1C 7.9 2021    LABMICR 13 07/10/2018     Lab Results   Component Value Date    CALCIUM 9.4 2021    PHOS 3.5 2021     Lab Results   Component Value Date    LDLCHOLESTEROL 111 2021       CURRENT MEDS W/ ASSOC DIAG         Start Date End Date     amLODIPine (NORVASC) 5 MG tablet  21  --     Take 1 tablet by mouth daily     Patient not taking: Reported on 2021     Associated Diagnoses:  --     aspirin 81 MG tablet  --  --     Associated Diagnoses:  Type 2 diabetes, uncontrolled, with neuropathy (HCC)     atorvastatin (LIPITOR) 20 MG tablet  16  --     Take 1 tablet by mouth daily     Patient not taking: Reported on 2021     Associated Diagnoses:  Type 2 diabetes, uncontrolled, with neuropathy (HCC)     furosemide (LASIX) 40 MG tablet  21  --     Associated Diagnoses:  --     gabapentin (NEURONTIN) 100 MG capsule ()  09/02/21  10/02/21     Take 1 capsule by mouth 3 times daily for 30 days.  Intended supply: 30 days     Associated Diagnoses:  --     glucose blood VI test strips (ASCENSIA AUTODISC VI;ONE TOUCH ULTRA TEST VI) strip  10/20/17  --     1 each by In Vitro route daily As needed. Patient not taking: Reported on 11/23/2021     Associated Diagnoses:  --     HYDROcodone-acetaminophen ([address]) 7.5-325 MG per tablet  07/18/21  --     Associated Diagnoses:  --     insulin lispro, 1 Unit Dial, (HUMALOG KWIKPEN) 100 UNIT/ML SOPN  04/12/21  --     Inject twice a day as per coverage schedule FS <150 -no coverage 151-200 - 4 units 201-250 - 6 units 251-300 - 8 units 301- 350 - 10 units 351- 400 -12 units >401- call MD     Associated Diagnoses:  --     Insulin NPH Isophane & Regular (HUMULIN;NOVOLIN) (70-30) 100 UNIT per ML injection pen  01/21/19  --     Inject 60 Units into the skin 2 times daily     Associated Diagnoses:  --     Insulin Pen Needle (PEN NEEDLES) 31G X 5 MM MISC  11/22/21  --     Inject 1 Units into the skin 2 times daily Relion Pen Needle     Associated Diagnoses:  --     INSULIN SYRINGE 1CC/29G (AIMSCO INSULIN SYR ULTRA THIN) 29G X 1/2\" 1 ML MISC  03/08/17  --     1 each by Does not apply route 2 times daily DX:E11.9     Associated Diagnoses:  Type 2 diabetes, uncontrolled, with neuropathy (HCC)     Insulin Syringe-Needle U-100 (RELION INSULIN SYRINGE 1ML/31G) 31G X 5/16\" 1 ML MISC  04/06/21  --     Inject 1 each into the skin 2 times daily     Associated Diagnoses:  --     losartan (COZAAR) 25 MG tablet  08/06/21  --     Take 2 tablets by mouth daily     Patient not taking: Reported on 11/23/2021     Associated Diagnoses:  --     metFORMIN (GLUCOPHAGE) 500 MG tablet  01/21/19  --     Take 1 tablet by mouth 2 times daily (with meals)     Patient not taking: Reported on 5/7/2021     Associated Diagnoses:  --     pioglitazone (ACTOS) 30 MG tablet  04/11/19  --     Take 1 tablet by mouth daily     Patient not taking: Reported on 5/7/2021     Associated Diagnoses:  --        Please note that this chart was generated using voice recognition Dragon dictation software. Although every effort was made to ensure the accuracy of this automated transcription, some errors in transcription may have occurred.     Electronically signed by Zenobia Max MD on 11/23/21 at 12:04 PM

## 2022-02-24 ENCOUNTER — OFFICE VISIT (OUTPATIENT)
Dept: PRIMARY CARE CLINIC | Age: 62
End: 2022-02-24
Payer: COMMERCIAL

## 2022-02-24 VITALS
RESPIRATION RATE: 16 BRPM | HEIGHT: 70 IN | HEART RATE: 76 BPM | BODY MASS INDEX: 37.11 KG/M2 | DIASTOLIC BLOOD PRESSURE: 81 MMHG | SYSTOLIC BLOOD PRESSURE: 140 MMHG | WEIGHT: 259.2 LBS

## 2022-02-24 DIAGNOSIS — N52.01 ERECTILE DYSFUNCTION DUE TO ARTERIAL INSUFFICIENCY: ICD-10-CM

## 2022-02-24 DIAGNOSIS — Z79.4 TYPE 2 DIABETES MELLITUS WITH DIABETIC NEUROPATHY, WITH LONG-TERM CURRENT USE OF INSULIN (HCC): Primary | ICD-10-CM

## 2022-02-24 DIAGNOSIS — E11.40 TYPE 2 DIABETES MELLITUS WITH DIABETIC NEUROPATHY, WITH LONG-TERM CURRENT USE OF INSULIN (HCC): Primary | ICD-10-CM

## 2022-02-24 DIAGNOSIS — E08.42 DIABETIC POLYNEUROPATHY ASSOCIATED WITH DIABETES MELLITUS DUE TO UNDERLYING CONDITION (HCC): ICD-10-CM

## 2022-02-24 DIAGNOSIS — Z23 NEED FOR PNEUMOCOCCAL VACCINATION: ICD-10-CM

## 2022-02-24 DIAGNOSIS — I10 ESSENTIAL HYPERTENSION: ICD-10-CM

## 2022-02-24 LAB — HBA1C MFR BLD: 8 %

## 2022-02-24 PROCEDURE — 99214 OFFICE O/P EST MOD 30 MIN: CPT | Performed by: FAMILY MEDICINE

## 2022-02-24 PROCEDURE — 90732 PPSV23 VACC 2 YRS+ SUBQ/IM: CPT | Performed by: FAMILY MEDICINE

## 2022-02-24 PROCEDURE — 83036 HEMOGLOBIN GLYCOSYLATED A1C: CPT | Performed by: FAMILY MEDICINE

## 2022-02-24 PROCEDURE — 90471 IMMUNIZATION ADMIN: CPT | Performed by: FAMILY MEDICINE

## 2022-02-24 PROCEDURE — 3052F HG A1C>EQUAL 8.0%<EQUAL 9.0%: CPT | Performed by: FAMILY MEDICINE

## 2022-02-24 RX ORDER — FLASH GLUCOSE SENSOR
2 KIT MISCELLANEOUS
Qty: 2 EACH | Refills: 3 | Status: SHIPPED | OUTPATIENT
Start: 2022-02-24

## 2022-02-24 RX ORDER — SILDENAFIL 50 MG/1
50 TABLET, FILM COATED ORAL PRN
Qty: 10 TABLET | Refills: 0 | Status: SHIPPED | OUTPATIENT
Start: 2022-02-24

## 2022-02-24 NOTE — PROGRESS NOTES
Jony Arce is a 62 y.o. male here for routine follow up of diabetes and HTN and hyperlipidemia. Diabetes:  He has been checking  his blood glucose levels regularly. He denies numbness and tingling in the hands and feet. He has been compliant with diet and exercise. He has been compliant with his medications. He is tolerating medications. Hypertension:    He is exercising and is adherent to low salt diet. Blood pressure has been fluctuating at home  . Cardiac symptoms none. Patient denies chest pain. Cardiovascular risk factors: advanced age (older than 54 for men, 72 for women), diabetes mellitus, dyslipidemia, hypertension and male gender. Use of agents associated with hypertension: none. Hyperlipidemia:    There is a family history of hyperlipidemia. There is not a family history of early ischemia heart disease. Allergies:  Patient has no known allergies.     Past Medical History:    Past Medical History:   Diagnosis Date    DM (diabetes mellitus) ([facility] [address] [facility]     Varicose veins of both legs with edema     many years       Past Surgical History:    Past Surgical History:   Procedure Laterality Date    COLONOSCOPY N/A 7/16/2018    COLONOSCOPY POLYPECTOMY SNARE/COLD BIOPSY performed by Giancarlo Ashby MD at [address] [place] [address]  07/16/2018    -polyps(adenomatouspolyps,portions of adenomatous polyps and tubulovillous adenoma)    OTHER SURGICAL HISTORY      Laser surgery both eyes for diabetic retinopathy       Social History:   Social History     Tobacco Use    Smoking status: Never Smoker    Smokeless tobacco: Never Used   Substance Use Topics    Alcohol use: Yes     Comment: VERY RARE       Family History:   Family History   Problem Relation Age of Onset    Diabetes Mother     Emphysema Father     Diabetes Father     Colon Cancer Father          Review of Systems:  Constitutional: negative for fever or chills  Eyes: negative for visual disturbance   ENT: negative for sore throat or nasal congestion  Respiratory: negative for cough, shortness of breath and sputum  Cardiovascular: negative for chest pain,pnd,claudication or palpitations  Gastrointestinal: negative for abd pain, stella,nausea, vomiting, diarrhea or constipation  Genitourinary: negative for dysuria,,hematuria urgency or frequency  Musculoskeletal:negative for arthralgias, muscle weakness and stiff joints   Integument/breast: negative for skin rash or lesions  Neurological: positive for   numbness and tingling. Psych: negative for anxiety, depression, suicidial ideation and suicidal attempt. Objective:  Physical Exam:  BP (!) 140/81 (Site: Left Upper Arm, Position: Sitting)   Pulse 76   Resp 16   Ht 5' 10\" (1.778 m)   Wt 259 lb 3.2 oz (117.6 kg)   BMI 37.19 kg/m²   GEN:   He is alert and oriented  ENT:    ENT exam normal, no neck nodes or sinus tenderness  NECK:   neck supple and non tender without mass, no thyromegaly or thyroid nodules, no cervical lymphadenopathy  EYES:   No gross abnormalities. CVS:     CVS exam BP noted to be well controlled today in office, S1, S2 normal, no gallop, no murmur, chest clear, no JVD, no HSM, no edema  PULM:   chest clear, no wheezing, rales, normal symmetric air entry, no tachypnea, retractions or cyanosis  ABD:   exam deferred  MUSC: no joint tenderness, deformity or swelling, has amputation of rt 2nd and third toe  Skin : has calosities of feet ,has warts lt hand  EXT: {EXTREMITIES EXAM:51050::\"Extremities: + 2 pedal pulses, no edema or calf tenderness, and warm to touch. 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 Type 2 diabetes mellitus with diabetic neuropathy, with long-term current use of insulin ([facility]    2. Essential hypertension    3. Diabetic polyneuropathy associated with diabetes mellitus due to underlying condition ([facility]      Plan   Diagnosis Orders   1. Type 2 diabetes mellitus with diabetic neuropathy, with long-term current use of insulin (HCC) - not well controlled,increase  POCT glycosylated hemoglobin (Hb A1C)   2. Essential hypertension     3. Diabetic polyneuropathy associated with diabetes mellitus due to underlying condition (HCC)         · Check BS 3 times per day  · Medication change: increase 70/30 to 55 units  · Encouraged low fat, low sodium and diabetic, 1800 calorie diet.   · Goal for blood pressure control is 130/80  · Recommended regular exercise as tolerated, 5 times per week  · Labs reviewed -hba1c 8  · Labs ordered: urine for microalbumin  · Cauterization of warts  · He is going to reschedule his colonocopy  ascivd risk  Orders Placed This Encounter   Procedures    POCT glycosylated hemoglobin (Hb A1C)       Current Outpatient Medications   Medication Sig Dispense Refill    Insulin NPH Isophane & Regular (HUMULIN;NOVOLIN) (70-30) 100 UNIT per ML injection pen Inject 55 Units into the skin 2 times daily 5 pen 3    Insulin Pen Needle (PEN NEEDLES) 31G X 5 MM MISC Inject 1 Units into the skin 2 times daily Relion Pen Needle 100 each 5    furosemide (LASIX) 40 MG tablet TAKE 1 TABLET BY MOUTH ONCE DAILY      amLODIPine (NORVASC) 5 MG tablet Take 1 tablet by mouth daily 90 tablet 0    losartan (COZAAR) 25 MG tablet Take 2 tablets by mouth daily 180 tablet 0    insulin lispro, 1 Unit Dial, (HUMALOG KWIKPEN) 100 UNIT/ML SOPN Inject twice a day as per coverage schedule FS <150 -no coverage 151-200 - 4 units 201-250 - 6 units 251-300 - 8 units 301- 350 - 10 units 351- 400 -12 units >401- call MD 1 pen 3    Insulin Syringe-Needle U-100 (RELION INSULIN SYRINGE 1ML/31G) 31G X 5/16\" 1 ML MISC Inject 1 each into the skin 2 times daily 100 each 3    pioglitazone (ACTOS) 30 MG tablet Take 1 tablet by mouth daily 90 tablet 2    INSULIN SYRINGE 1CC/29G (AIMSCO INSULIN SYR ULTRA THIN) 29G X 1/2\" 1 ML MISC 1 each by Does not apply route 2 times daily DX:E11.9 200 each 3    aspirin 81 MG tablet Take 81 mg by mouth daily      atorvastatin (LIPITOR) 20 MG tablet Take 1 tablet by mouth daily 90 tablet 0     No current facility-administered medications for this visit. No follow-ups on file.       Electronically signed by Chema Chaves MD on 2/24/2022 at 9:28 AM

## 2022-02-28 ENCOUNTER — OFFICE VISIT (OUTPATIENT)
Dept: PRIMARY CARE CLINIC | Age: 62
End: 2022-02-28
Payer: COMMERCIAL

## 2022-02-28 DIAGNOSIS — B07.9 VIRAL WARTS, UNSPECIFIED TYPE: Primary | ICD-10-CM

## 2022-02-28 PROCEDURE — 17110 DESTRUCTION B9 LES UP TO 14: CPT | Performed by: FAMILY MEDICINE

## 2022-02-28 NOTE — PROGRESS NOTES
Patient is here for cauterization of 5 warts on his left hand. Has 2 warts on thumb,2 on index and one on middle finger,increasing on size    After appropriate consent obtained,lt hand cleaned with alcohol and all 5 warts anesthetized with lidocaine. After adequate anesthesia achieved,all warts curetted and removed. Resulting beds were cauterized and no bleeding observed. Dressing applied and wound care instructions given.

## 2022-05-06 RX ORDER — SYRINGE AND NEEDLE,INSULIN,1ML 31 GX5/16"
SYRINGE, EMPTY DISPOSABLE MISCELLANEOUS
Qty: 100 EACH | Refills: 0 | Status: SHIPPED | OUTPATIENT
Start: 2022-05-06

## 2022-05-06 RX ORDER — IBUPROFEN 200 MG
1 TABLET ORAL 2 TIMES DAILY
Qty: 200 EACH | Refills: 3 | Status: SHIPPED | OUTPATIENT
Start: 2022-05-06

## 2022-07-26 ENCOUNTER — TELEMEDICINE (OUTPATIENT)
Dept: PRIMARY CARE CLINIC | Age: 62
End: 2022-07-26
Payer: COMMERCIAL

## 2022-07-26 DIAGNOSIS — U07.1 COVID-19 VIRUS INFECTION: Primary | ICD-10-CM

## 2022-07-26 PROCEDURE — 2022F DILAT RTA XM EVC RTNOPTHY: CPT | Performed by: FAMILY MEDICINE

## 2022-07-26 PROCEDURE — G8417 CALC BMI ABV UP PARAM F/U: HCPCS | Performed by: FAMILY MEDICINE

## 2022-07-26 PROCEDURE — G8428 CUR MEDS NOT DOCUMENT: HCPCS | Performed by: FAMILY MEDICINE

## 2022-07-26 PROCEDURE — 3017F COLORECTAL CA SCREEN DOC REV: CPT | Performed by: FAMILY MEDICINE

## 2022-07-26 PROCEDURE — 99213 OFFICE O/P EST LOW 20 MIN: CPT | Performed by: FAMILY MEDICINE

## 2022-07-26 PROCEDURE — 3052F HG A1C>EQUAL 8.0%<EQUAL 9.0%: CPT | Performed by: FAMILY MEDICINE

## 2022-07-26 PROCEDURE — 1036F TOBACCO NON-USER: CPT | Performed by: FAMILY MEDICINE

## 2022-07-26 NOTE — PROGRESS NOTES
Due to this being a TeleHealth encounter (During YDCEI-08 public health emergency), evaluation of the following organ systems was limited: Vitals/Constitutional/EENT/Resp/CV/GI//MS/Neuro/Skin/Heme-Lymph-Imm. Pursuant to the emergency declaration under the [address] [place] [address] and the [employer] Act, this Virtual Visit was conducted with patient's (and/or legal guardian's) verbal consent, to reduce the patient's risk of exposure to COVID-19 and provide necessary medical care. The patient (and/or legal guardian) has also been advised to contact this office for worsening conditions or problems, and seek emergency medical treatment and/or call 911 if deemed necessary. Services were provided through a video discussion to substitute for in-person clinic visit. Patient was located at their individual home.
Cardiovascular: negative for chest pain,pnd or palpitations  Gastrointestinal: negative for abd pain, nausea, vomiting, diarrhea or constipation  Integument/breast: negative for skin rash or lesions  Neurological: negative for unilateral weakness, numbness or tingling. No joint pain,bodyache     Objective: There were no vitals taken for this visit. GEN:  He is alert and oriented. no distress  ECVS:   No cyanosis  PULM:   No respitratory distress,cyanosis    Assessment:  1. COVID-19 virus infection    2.  Type 2 diabetes, uncontrolled, with neuropathy ([facility] [address]      Plan:  Encouraged increased oral fluids  Medications:  to decide after bmp  bmp  May use OTC meds:    Tylenol 500 mg po q6 hrs PRN fever   Follow up PRN if symptoms do not resolve    Electronically signed by Fredis Gipson MD on 7/26/2022 at 1:26 PM

## 2022-07-27 ENCOUNTER — HOSPITAL ENCOUNTER (OUTPATIENT)
Age: 62
Discharge: HOME | End: 2022-07-27
Payer: COMMERCIAL

## 2022-07-27 DIAGNOSIS — Z79.4 TYPE 2 DIABETES MELLITUS WITH DIABETIC NEUROPATHY, WITH LONG-TERM CURRENT USE OF INSULIN (HCC): ICD-10-CM

## 2022-07-27 DIAGNOSIS — E11.40 TYPE 2 DIABETES MELLITUS WITH DIABETIC NEUROPATHY, WITH LONG-TERM CURRENT USE OF INSULIN (HCC): ICD-10-CM

## 2022-07-27 LAB
ANION GAP SERPL CALCULATED.3IONS-SCNC: 9 MMOL/L (ref 9–17)
BUN BLDV-MCNC: 20 MG/DL (ref 8–23)
BUN/CREAT BLD: 20 (ref 9–20)
CALCIUM SERPL-MCNC: 9.6 MG/DL (ref 8.6–10.4)
CHLORIDE BLD-SCNC: 95 MMOL/L (ref 98–107)
CO2: 28 MMOL/L (ref 20–31)
CREAT SERPL-MCNC: 1.01 MG/DL (ref 0.7–1.2)
CREATININE URINE: 97.3 MG/DL (ref 39–259)
GFR AFRICAN AMERICAN: >60 ML/MIN
GFR NON-AFRICAN AMERICAN: >60 ML/MIN
GFR SERPL CREATININE-BSD FRML MDRD: ABNORMAL ML/MIN/{1.73_M2}
GFR SERPL CREATININE-BSD FRML MDRD: ABNORMAL ML/MIN/{1.73_M2}
GLUCOSE BLD-MCNC: 313 MG/DL (ref 70–99)
MICROALBUMIN/CREAT 24H UR: 24 MG/L
MICROALBUMIN/CREAT UR-RTO: 25 MCG/MG CREAT
POTASSIUM SERPL-SCNC: 4.8 MMOL/L (ref 3.7–5.3)
SODIUM BLD-SCNC: 132 MMOL/L (ref 135–144)

## 2022-07-27 PROCEDURE — 82043 UR ALBUMIN QUANTITATIVE: CPT

## 2022-07-27 PROCEDURE — 36415 COLL VENOUS BLD VENIPUNCTURE: CPT

## 2022-07-27 PROCEDURE — 82570 ASSAY OF URINE CREATININE: CPT

## 2022-07-27 PROCEDURE — 80048 BASIC METABOLIC PNL TOTAL CA: CPT

## 2022-08-10 ENCOUNTER — OFFICE VISIT (OUTPATIENT)
Dept: PRIMARY CARE CLINIC | Age: 62
End: 2022-08-10
Payer: COMMERCIAL

## 2022-08-10 ENCOUNTER — APPOINTMENT (OUTPATIENT)
Dept: GENERAL RADIOLOGY | Age: 62
End: 2022-08-10
Payer: COMMERCIAL

## 2022-08-10 ENCOUNTER — HOSPITAL ENCOUNTER (EMERGENCY)
Age: 62
Discharge: HOME | End: 2022-08-10
Attending: EMERGENCY MEDICINE
Payer: COMMERCIAL

## 2022-08-10 VITALS
HEART RATE: 65 BPM | BODY MASS INDEX: 36.08 KG/M2 | SYSTOLIC BLOOD PRESSURE: 108 MMHG | DIASTOLIC BLOOD PRESSURE: 66 MMHG | OXYGEN SATURATION: 98 % | HEIGHT: 70 IN | WEIGHT: 252 LBS

## 2022-08-10 VITALS
SYSTOLIC BLOOD PRESSURE: 132 MMHG | HEART RATE: 66 BPM | DIASTOLIC BLOOD PRESSURE: 69 MMHG | TEMPERATURE: 97.8 F | OXYGEN SATURATION: 98 % | RESPIRATION RATE: 16 BRPM

## 2022-08-10 DIAGNOSIS — M86.60 CHRONIC OSTEOMYELITIS (HCC): ICD-10-CM

## 2022-08-10 DIAGNOSIS — L08.9 RIGHT FOOT INFECTION: Primary | ICD-10-CM

## 2022-08-10 DIAGNOSIS — R19.7 DIARRHEA, UNSPECIFIED TYPE: ICD-10-CM

## 2022-08-10 DIAGNOSIS — R19.7 DIARRHEA, UNSPECIFIED TYPE: Primary | ICD-10-CM

## 2022-08-10 DIAGNOSIS — B37.0 ORAL THRUSH: ICD-10-CM

## 2022-08-10 LAB
ABSOLUTE EOS #: 0.31 K/UL (ref 0–0.44)
ABSOLUTE IMMATURE GRANULOCYTE: 0.08 K/UL (ref 0–0.3)
ABSOLUTE LYMPH #: 1 K/UL (ref 1.1–3.7)
ABSOLUTE MONO #: 0.87 K/UL (ref 0.1–1.2)
ALBUMIN SERPL-MCNC: 4.3 G/DL (ref 3.5–5.2)
ALBUMIN/GLOBULIN RATIO: 1.6 (ref 1–2.5)
ALP BLD-CCNC: 69 U/L (ref 40–129)
ALT SERPL-CCNC: 22 U/L (ref 5–41)
ANION GAP SERPL CALCULATED.3IONS-SCNC: 9 MMOL/L (ref 9–17)
AST SERPL-CCNC: 17 U/L
BASOPHILS # BLD: 0 % (ref 0–2)
BASOPHILS ABSOLUTE: 0.03 K/UL (ref 0–0.2)
BILIRUB SERPL-MCNC: 0.54 MG/DL (ref 0.3–1.2)
BUN BLDV-MCNC: 15 MG/DL (ref 8–23)
BUN/CREAT BLD: 20 (ref 9–20)
C DIFF AG + TOXIN: NEGATIVE
CALCIUM SERPL-MCNC: 8.5 MG/DL (ref 8.6–10.4)
CHLORIDE BLD-SCNC: 100 MMOL/L (ref 98–107)
CO2: 23 MMOL/L (ref 20–31)
CREAT SERPL-MCNC: 0.76 MG/DL (ref 0.7–1.2)
EOSINOPHILS RELATIVE PERCENT: 4 % (ref 1–4)
GFR AFRICAN AMERICAN: >60 ML/MIN
GFR NON-AFRICAN AMERICAN: >60 ML/MIN
GFR SERPL CREATININE-BSD FRML MDRD: ABNORMAL ML/MIN/{1.73_M2}
GFR SERPL CREATININE-BSD FRML MDRD: ABNORMAL ML/MIN/{1.73_M2}
GLUCOSE BLD-MCNC: 106 MG/DL (ref 70–99)
HCT VFR BLD CALC: 40.8 % (ref 40.7–50.3)
HEMOGLOBIN: 13.6 G/DL (ref 13–17)
IMMATURE GRANULOCYTES: 1 %
LYMPHOCYTES # BLD: 11 % (ref 24–43)
MCH RBC QN AUTO: 29.6 PG (ref 25.2–33.5)
MCHC RBC AUTO-ENTMCNC: 33.3 G/DL (ref 28.4–34.8)
MCV RBC AUTO: 88.9 FL (ref 82.6–102.9)
MONOCYTES # BLD: 10 % (ref 3–12)
NRBC AUTOMATED: 0 PER 100 WBC
PDW BLD-RTO: 12.8 % (ref 11.8–14.4)
PLATELET # BLD: 225 K/UL (ref 138–453)
PMV BLD AUTO: 9.9 FL (ref 8.1–13.5)
POTASSIUM SERPL-SCNC: 3.9 MMOL/L (ref 3.7–5.3)
RBC # BLD: 4.59 M/UL (ref 4.21–5.77)
SEG NEUTROPHILS: 74 % (ref 36–65)
SEGMENTED NEUTROPHILS ABSOLUTE COUNT: 6.49 K/UL (ref 1.5–8.1)
SODIUM BLD-SCNC: 132 MMOL/L (ref 135–144)
SPECIMEN DESCRIPTION: NORMAL
TOTAL PROTEIN: 7 G/DL (ref 6.4–8.3)
WBC # BLD: 8.8 K/UL (ref 3.5–11.3)

## 2022-08-10 PROCEDURE — 87506 IADNA-DNA/RNA PROBE TQ 6-11: CPT

## 2022-08-10 PROCEDURE — 96365 THER/PROPH/DIAG IV INF INIT: CPT

## 2022-08-10 PROCEDURE — 99214 OFFICE O/P EST MOD 30 MIN: CPT | Performed by: STUDENT IN AN ORGANIZED HEALTH CARE EDUCATION/TRAINING PROGRAM

## 2022-08-10 PROCEDURE — 87324 CLOSTRIDIUM AG IA: CPT

## 2022-08-10 PROCEDURE — 6360000002 HC RX W HCPCS: Performed by: EMERGENCY MEDICINE

## 2022-08-10 PROCEDURE — 99284 EMERGENCY DEPT VISIT MOD MDM: CPT

## 2022-08-10 PROCEDURE — 73630 X-RAY EXAM OF FOOT: CPT

## 2022-08-10 PROCEDURE — 85025 COMPLETE CBC W/AUTO DIFF WBC: CPT

## 2022-08-10 PROCEDURE — 1036F TOBACCO NON-USER: CPT | Performed by: STUDENT IN AN ORGANIZED HEALTH CARE EDUCATION/TRAINING PROGRAM

## 2022-08-10 PROCEDURE — 36415 COLL VENOUS BLD VENIPUNCTURE: CPT

## 2022-08-10 PROCEDURE — 3017F COLORECTAL CA SCREEN DOC REV: CPT | Performed by: STUDENT IN AN ORGANIZED HEALTH CARE EDUCATION/TRAINING PROGRAM

## 2022-08-10 PROCEDURE — 87449 NOS EACH ORGANISM AG IA: CPT

## 2022-08-10 PROCEDURE — 2580000003 HC RX 258: Performed by: EMERGENCY MEDICINE

## 2022-08-10 PROCEDURE — G8417 CALC BMI ABV UP PARAM F/U: HCPCS | Performed by: STUDENT IN AN ORGANIZED HEALTH CARE EDUCATION/TRAINING PROGRAM

## 2022-08-10 PROCEDURE — G8427 DOCREV CUR MEDS BY ELIG CLIN: HCPCS | Performed by: STUDENT IN AN ORGANIZED HEALTH CARE EDUCATION/TRAINING PROGRAM

## 2022-08-10 PROCEDURE — 80053 COMPREHEN METABOLIC PANEL: CPT

## 2022-08-10 RX ORDER — LEVOFLOXACIN 5 MG/ML
750 INJECTION, SOLUTION INTRAVENOUS ONCE
Status: COMPLETED | OUTPATIENT
Start: 2022-08-10 | End: 2022-08-10

## 2022-08-10 RX ORDER — LINEZOLID 600 MG/1
600 TABLET, FILM COATED ORAL 2 TIMES DAILY
Qty: 28 TABLET | Refills: 0 | Status: CANCELLED | OUTPATIENT
Start: 2022-08-10 | End: 2022-08-24

## 2022-08-10 RX ORDER — DOXYCYCLINE HYCLATE 100 MG
100 TABLET ORAL 2 TIMES DAILY
Qty: 14 TABLET | Refills: 0 | Status: CANCELLED | OUTPATIENT
Start: 2022-08-10 | End: 2022-08-17

## 2022-08-10 RX ORDER — OMEPRAZOLE 20 MG/1
20 CAPSULE, DELAYED RELEASE ORAL DAILY
COMMUNITY

## 2022-08-10 RX ORDER — LOPERAMIDE HYDROCHLORIDE 2 MG/1
2 CAPSULE ORAL 4 TIMES DAILY PRN
Qty: 40 CAPSULE | Refills: 0 | Status: CANCELLED | OUTPATIENT
Start: 2022-08-10 | End: 2022-08-20

## 2022-08-10 RX ORDER — 0.9 % SODIUM CHLORIDE 0.9 %
1000 INTRAVENOUS SOLUTION INTRAVENOUS ONCE
Status: COMPLETED | OUTPATIENT
Start: 2022-08-10 | End: 2022-08-10

## 2022-08-10 RX ADMIN — SODIUM CHLORIDE 1000 ML: 9 INJECTION, SOLUTION INTRAVENOUS at 13:30

## 2022-08-10 RX ADMIN — LEVOFLOXACIN 750 MG: 750 INJECTION, SOLUTION INTRAVENOUS at 17:16

## 2022-08-10 ASSESSMENT — PATIENT HEALTH QUESTIONNAIRE - PHQ9
1. LITTLE INTEREST OR PLEASURE IN DOING THINGS: 0
DEPRESSION UNABLE TO ASSESS: PT REFUSES
2. FEELING DOWN, DEPRESSED OR HOPELESS: 0
SUM OF ALL RESPONSES TO PHQ QUESTIONS 1-9: 0
SUM OF ALL RESPONSES TO PHQ9 QUESTIONS 1 & 2: 0
SUM OF ALL RESPONSES TO PHQ QUESTIONS 1-9: 0

## 2022-08-10 ASSESSMENT — PAIN - FUNCTIONAL ASSESSMENT
PAIN_FUNCTIONAL_ASSESSMENT: NONE - DENIES PAIN
PAIN_FUNCTIONAL_ASSESSMENT: NONE - DENIES PAIN

## 2022-08-10 NOTE — ED NOTES
Contacted [facility] [employer], podiatry at [facility], [place], operated by [facility] per Dr. Sejal Marcelino request.     Livan Nelson Reser  08/10/22 9720

## 2022-08-10 NOTE — ED NOTES
[facility] called with podiatry on line to speak with Dr. Billy Rosales.      Fortunato Wesley  08/10/22 8843

## 2022-08-10 NOTE — PROGRESS NOTES
[address], [address] , [facility]  1960 is a 62 y.o. male, for evaluation of the following chief complaint(s):    Diarrhea and Thrush       ASSESSMENT/PLAN:    1. Right foot infection  2. Oral thrush  3. Diarrhea, unspecified type     There are no discontinued medications. I discussed my concerns regarding his symptoms secondary to multiple episodes of diarrhea. I discussed that given his symptoms, there is concern for possible infection/sepsis, bel, C. Diff, and/or dehydration secondary to possible side effects from the antibiotic. Plan to continue follow-up with Podiatry and then place the patient on an alternative antibiotic depending on sensitivity report from [facility]. Plan to hold BP meds for now given dehydration. [facility] was contacted to discuss which antibiotic were prescribed - which most recently was Levaquin. The patient showed me a picture of Levaquin on his phone. Dr. Luis Paulino's office was contacted and notes sent were sent over for review. [facility] was also contacted re: preliminary report received re: cultures - Discussed w. Micro lab to receive the final cultures which were faxed over for review stat. Results were reviewed. The case discussed with  in the ED Department for transition/continuty of care. Return in about 1 week (around 8/17/2022) for F/U Wound. [address] received counseling on the following healthy behaviors: nutrition, exercise and medication adherence. I encouraged and discussed lifestyle modifications including diet and exercise and the patient was agreeable to making positive/beneficial changes to both to help improve their overall health. Discussed use, benefit, and side effects of prescribed medications. Barriers to medication compliance addressed. Patient given educational materials: see patient instructions.      HM - HM items completed today as per orders. Outstanding HM items though not limited to immunizations were discussed with the patient today, including risks, benefits and alternatives. The patient will discuss these during the next appointment per their preference. If there are any worsening or concerning signs or symptoms, patient will report to the ED and/or contact [phone] for immediate evaluation. Teach back method was used. All patient questions answered. Pt voiced understanding. Subjective    SUBJECTIVE/OBJECTIVE:    HPI     Diarrhea and Thrush  for 2 days  Nature of Onset and Mechanism -  sudden, worsening  Characteristics/Radiation/Quality - The patient has a history of a diabetic foot infection and had been following with his Podiatrist closely. The patient had noticed a blister on the right foot that hardened over and had gone for follow-up. The wound was cultured. He had been started on Linezolid but then switched to Levaquin for about 2 days. For the last 2 days he had GI symptoms, including GERD/ and 20+ episodes of non-bloody diarrhea with nausea and poor PO intake. He also developed thrush and was provided with nystatin which he had been taking. He also had low blood glucose/sugars. He denies any pain. No known history of C. Diff. He is allergic to Bactrim/had Acute Renal Failure requiring Dialysis/RRT for about 1 month. He had been treated with Linezolid in the past. While in the office the patient noted that he was feeling sick like he was going to pass out and we placed him on the examination table. The patient was feeling better after several minutes of rest while being evaluated. His his typical BP readings are hypertensive and these readings are unusual for him. Aggravating Factors - nothing in particular, other than the antibiotic. Relieving Factors/Treatment tried - probiotics.     Family History   Problem Relation Age of Onset    Diabetes Mother     Emphysema Father     Diabetes Father     Colon Cancer Father        Health Maintenance   Alcohol/Substance use History - None  Smoking History    Tobacco Use      Smoking status: Never      Smokeless tobacco: Never      Health Maintenance   Topic Date Due    Shingles vaccine (1 of 2) Never done    Diabetic retinal exam  01/24/2021    Colorectal Cancer Screen  07/16/2021    Diabetic foot exam  04/12/2022    Lipids  04/12/2022    Prostate Specific Antigen (PSA) Screening or Monitoring  04/12/2022    Hepatitis C screen  11/15/2026 (Originally 8/25/1978)    HIV screen  11/15/2026 (Originally 8/25/1976)    Flu vaccine (1) 09/01/2022    Depression Screen  11/23/2022    COVID-19 Vaccine (3 - Booster for Pfizer series) 12/19/2022    A1C test (Diabetic or Prediabetic)  02/24/2023    Pneumococcal 0-64 years Vaccine (2 - PCV) 02/24/2023    Diabetic microalbuminuria test  07/27/2023    DTaP/Tdap/Td vaccine (2 - Td or Tdap) 07/19/2027    Hepatitis A vaccine  Aged Out    Hib vaccine  Aged Out    Meningococcal (ACWY) vaccine  Aged Out      Depression Screening  PHQ Scores 8/10/2022 11/23/2021 9/2/2021 4/12/2021 4/17/2020 4/11/2019 1/26/2018   PHQ2 Score 0 2 2 2 0 0 0   PHQ9 Score 0 2 2 2 0 0 0     Interpretation of Total Score Depression Severity: 1-4 = Minimal depression, 5-9 = Mild depression, 10-14 = Moderate depression, 15-19 = Moderately severe depression, 20-27 = Severe depression      Review of Systems   Constitutional: Negative for activity change, appetite change, chills, diaphoresis, fatigue, fever and unexpected weight change. HENT: Negative for sinus pressure, sinus pain, sore throat and trouble swallowing. Oral thrush. Respiratory: Negative for cough, shortness of breath and wheezing. Cardiovascular: Negative for chest pain, palpitations and leg swelling. Gastrointestinal: Negative for abdominal pain, positive for diarrhea, nausea and no vomiting. Endocrine: Negative for cold intolerance, polydipsia, polyphagia and polyuria.    Genitourinary: Negative for difficulty urinating, flank pain and frequency. Musculoskeletal: Negative for gait problem and joint swelling. Negative for back pain, neck pain and neck stiffness. Skin: Negative for color change and positive for wound. Negative for pallor and rash. Allergic/Immunologic: Negative for environmental allergies and food allergies. Neurological: Negative for light-headedness, numbness and headaches. Psychiatric/Behavioral: Negative for sleep disturbance. Negative for confusion and suicidal ideas. Objective    Physical Exam   Constitutional: Patient is oriented to person, place, and time. Patient appears well-developed and well-nourished. No distress. HENT: Head: Normocephalic and atraumatic. Eyes: Pupils are equal, round, and reactive to light. Conjunctivae are normal. Right eye exhibits no discharge. Left eye exhibits no discharge. Cardiovascular: Normal rate, regular rhythm and normal heart sounds. Pulmonary/Chest: Effort normal and breath sounds normal. No respiratory distress. Patient has no wheezes. Abdominal: Soft. Bowel sounds are normal. Patient exhibits no distension. There is no tenderness. Musculoskeletal:  Patient exhibits no edema and tenderness. Patient exhibits no deformity. Neurological: Patient is alert and oriented to person, place, and time. Skin: Skin is warm and dry. Patient is not diaphoretic. Skin wound of the toe was evaluated. Minimal redness present with no discharge at the distal 2nd phalynx present in the pulp. No tenderness to palpation was noted, s/p multiple amputations present of the 3/4th digit. Psychiatric: Patient's speech is normal and behavior is normal. Thought content normal.   Vitals reviewed.     /66   Pulse 65   Ht 5' 10\" (1.778 m)   Wt 252 lb (114.3 kg)   SpO2 98%   BMI 36.16 kg/m²   BP Readings from Last 3 Encounters:   08/10/22 132/69   08/10/22 108/66   02/24/22 (!) 140/81     Lab Results   Component Value Date    WBC 8.8 08/10/2022    HGB 13.6 08/10/2022 [facility]  05/06/22  --     1 each by Does not apply route 2 times daily DX:E11.9     Associated Diagnoses:  Type 2 diabetes, uncontrolled, with neuropathy (HCC)     losartan (COZAAR) 25 MG tablet  08/06/21  --     Take 2 tablets by mouth daily     Associated Diagnoses:  --     pioglitazone (ACTOS) 30 MG tablet  04/11/19  --     Take 1 tablet by mouth daily     Associated Diagnoses:  --     RELION INSULIN SYRINGE 1ML/31G 31G X 5/16\" 1 ML MISC  05/06/22  --     USE 1  TWICE DAILY     Associated Diagnoses:  --     sildenafil (VIAGRA) 50 MG tablet  02/24/22  --     Take 1 tablet by mouth as needed for Erectile Dysfunction     Associated Diagnoses:  --              Please note that this chart was generated using voice recognition Dragon dictation software. Although every effort was made to ensure the accuracy of this automated transcription, some errors in transcription may have occurred.   Electronically signed by Roxy Gamboa MD on 8/10/2022 at 9:57 PM

## 2022-08-11 LAB
CAMPYLOBACTER PCR: ABNORMAL
E COLI ENTEROTOXIGENIC PCR: ABNORMAL
PLESIOMONAS SHIGELLOIDES PCR: ABNORMAL
SALMONELLA PCR: ABNORMAL
SHIGATOXIN GENE PCR: ABNORMAL
SHIGELLA SP PCR: ABNORMAL
SPECIMEN DESCRIPTION: ABNORMAL
VIBRIO PCR: ABNORMAL
YERSINIA ENTEROCOLITICA PCR: ABNORMAL

## 2022-08-11 NOTE — ED PROVIDER NOTES
[address] [facility] ED  EMERGENCY DEPARTMENT ENCOUNTER      Pt Name: Armando Kenney  MRN: [id number]  [place] 1960  Date of evaluation: 8/10/2022  Provider: Jarred Gonsales MD    CHIEF COMPLAINT       Chief Complaint   Patient presents with    Other     Sent by Dr. Kimberley Noyola for reaction to antibiotic         HISTORY OF PRESENT ILLNESS   (Location/Symptom, Timing/Onset, Context/Setting, Quality, Duration, Modifying Factors, Severity)  Note limiting factors. Armando Kenney is a 62 y.o. male who presents to the emergency department      Very pleasant 63 yo gentleman sent to the ED form PCP's office for evaluation after becoming lightheaded and weak in the office. Patient has had multiple episodes of diarrhea for the past 2-3 days. No nausea or vomiting. No abdominal pain. Patient currently on Levaquin for infection of his right 2nd toe. Per patient he was initially started on Linezolid Last Thursday for this infection. Over the weekend this was changed to [address] by his podiatrist after culture reports were reviewed. Patient recently recovered from Covid 19 with minimal symptoms. No blood or mucus in his stool. No fevers or chills. Patient was seen by positry yesterday and told to hold Abx for a day or so. Has appointment next Tuesday and is scheduled for an MRI of his foot. Was at his PCP's office today and became very lightheaded and weak as if he was going to faint. Denies chest pain, palpitations, no syncope, no headaches, no other acute concerns. Nursing Notes were reviewed. REVIEW OF SYSTEMS    (2-9 systems for level 4, 10 or more for level 5)     Review of Systems   All other systems reviewed and are negative. Except as noted above the remainder of the review of systems was reviewed and negative.        PAST MEDICAL HISTORY     Past Medical History:   Diagnosis Date    DM (diabetes mellitus) ([facility] [address]     Varicose veins of both legs with edema     many years         SURGICAL HISTORY Past Surgical History:   Procedure Laterality Date    COLONOSCOPY N/A 7/16/2018    COLONOSCOPY POLYPECTOMY SNARE/COLD BIOPSY performed by Megan Tijerina MD at [address]  07/16/2018    -polyps(adenomatouspolyps,portions of adenomatous polyps and tubulovillous adenoma)    OTHER SURGICAL HISTORY      Laser surgery both eyes for diabetic retinopathy         CURRENT MEDICATIONS       Discharge Medication List as of 8/10/2022  5:00 PM        CONTINUE these medications which have NOT CHANGED    Details   omeprazole (PRILOSEC) 20 MG delayed release capsule Take 20 mg by mouth in the morning. Historical Med      RELION INSULIN SYRINGE 1ML/31G 31G X 5/16\" 1 ML MISC Disp-100 each, R-0, Normal      INSULIN SYRINGE 1CC/29G (AIMSCO INSULIN SYR ULTRA THIN) 29G X 1/2\" 1 ML MISC 2 TIMES DAILY Starting Fri 5/6/2022, Disp-200 each, R-3, NormalDX:E11.9      Insulin NPH Isophane & Regular (HUMULIN;NOVOLIN) (70-30) 100 UNIT per ML injection pen Inject 55 Units into the skin 2 times daily, Disp-5 pen, R-3Normal      Continuous Blood Gluc Sensor (FREESTYLE LINDA 14 DAY SENSOR) MISC 2 Units by Does not apply route every 14 days, Disp-2 each, R-3Normal      sildenafil (VIAGRA) 50 MG tablet Take 1 tablet by mouth as needed for Erectile Dysfunction, Disp-10 tablet, R-0Normal      Insulin Pen Needle (PEN NEEDLES) 31G X 5 MM MISC Inject 1 Units into the skin 2 times daily Relion Pen Needle, Disp-100 each, R-5Ok for pharmacist to substitute based on Availability, preference, e.g. [id number] prescribed before, thank youNormal      furosemide (LASIX) 40 MG tablet TAKE 1 TABLET BY MOUTH ONCE DAILYHistorical Med      amLODIPine (NORVASC) 5 MG tablet Take 1 tablet by mouth daily, Disp-90 tablet, R-0Normal      losartan (COZAAR) 25 MG tablet Take 2 tablets by mouth daily, Disp-180 tablet, R-0Normal      insulin lispro, 1 Unit Dial, (HUMALOG KWIKPEN) 100 UNIT/ML SOPN Inject twice a day as per coverage schedule FS <150 -no coverage 151-200 - 4 units 201-250 - 6 units 251-300 - 8 units 301- 350 - 10 units 351- 400 -12 units >401- call MD, Disp-1 pen, R-3Normal      pioglitazone (ACTOS) 30 MG tablet Take 1 tablet by mouth daily, Disp-90 tablet, R-2Normal      aspirin 81 MG tablet Take 81 mg by mouth daily      atorvastatin (LIPITOR) 20 MG tablet Take 1 tablet by mouth daily, Disp-90 tablet, R-0             ALLERGIES     Bactrim [sulfamethoxazole-trimethoprim]    FAMILY HISTORY       Family History   Problem Relation Age of Onset    Diabetes Mother     Emphysema Father     Diabetes Father     Colon Cancer Father           SOCIAL HISTORY       Social History     Socioeconomic History    Marital status: Legally     Number of children: 2   Occupational History    Occupation:    Tobacco Use    Smoking status: Never    Smokeless tobacco: Never   Substance and Sexual Activity    Alcohol use: Yes     Comment: VERY RARE    Drug use: No    Sexual activity: Not Currently       SCREENINGS        Erica Coma Scale  Eye Opening: Spontaneous  Best Verbal Response: Oriented  Best Motor Response: Obeys commands  Erica Coma Scale Score: 15               PHYSICAL EXAM    (up to 7 for level 4, 8 or more for level 5)     ED Triage Vitals   BP Temp Temp Source Heart Rate Resp SpO2 Height Weight   08/10/22 1235 08/10/22 1235 08/10/22 1847 08/10/22 1235 08/10/22 1235 08/10/22 1235 -- --   138/87 97.5 °F (36.4 °C) Tympanic 81 18 98 %         Physical Exam  Vitals and nursing note reviewed. Constitutional:       General: He is not in acute distress. Appearance: He is not toxic-appearing. Comments: afebrile   HENT:      Head: Normocephalic and atraumatic. Cardiovascular:      Rate and Rhythm: Regular rhythm. Pulmonary:      Effort: Pulmonary effort is normal.      Breath sounds: Normal breath sounds. Abdominal:      General: There is no distension. Palpations: Abdomen is soft. Tenderness:  There is no abdominal tenderness. Musculoskeletal:      Cervical back: Normal range of motion and neck supple. Skin:     General: Skin is warm and dry. Comments: Right foot previous toe amputations- second digit distal small approx 5 mm dry necrotic lesion. No surrounding erythema. No tenderness though patient has chronic significant neuropathy   Neurological:      General: No focal deficit present. Mental Status: He is alert and oriented to person, place, and time. Psychiatric:         Mood and Affect: Mood normal.       DIAGNOSTIC RESULTS     EKG: All EKG's are interpreted by the Emergency Department Physician who either signs or Co-signs this chart in the absence of a cardiologist.        RADIOLOGY:   Non-plain film images such as CT, Ultrasound and MRI are read by the radiologist. Plain radiographic images are visualized and preliminarily interpreted by the emergency physician with the below findings:        Interpretation per the Radiologist below, if available at the time of this note:    XR FOOT RIGHT (MIN 3 VIEWS)   Final Result   Destructive changes involving the terminal tuft of the distal 2nd phalanx   suggesting osteomyelitis. Degenerative changes               ED BEDSIDE ULTRASOUND:   Performed by ED Physician - none    LABS:  Labs Reviewed   CBC WITH AUTO DIFFERENTIAL - Abnormal; Notable for the following components:       Result Value    Seg Neutrophils 74 (*)     Lymphocytes 11 (*)     Immature Granulocytes 1 (*)     Absolute Lymph # 1.00 (*)     All other components within normal limits   COMPREHENSIVE METABOLIC PANEL - Abnormal; Notable for the following components:    Glucose 106 (*)     Calcium 8.5 (*)     Sodium 132 (*)     All other components within normal limits   C DIFF TOXIN/ANTIGEN   GASTROINTESTINAL PANEL, MOLECULAR       All other labs were within normal range or not returned as of this dictation.     EMERGENCY DEPARTMENT COURSE and DIFFERENTIAL DIAGNOSIS/MDM:   Vitals:    Vitals: 08/10/22 1235 08/10/22 1847   BP: 138/87 132/69   Pulse: 81 66   Resp: 18 16   Temp: 97.5 °F (36.4 °C) 97.8 °F (36.6 °C)   TempSrc:  Tympanic   SpO2: 98%            MDM  Number of Diagnoses or Management Options  Chronic osteomyelitis (HCC)  Diarrhea, unspecified type  Diagnosis management comments: 61 yo male with diarrhea for past few days, became lightheaded and weak while in pcp's office. Recent abx use for toe infection. No blood or mucus in stool. No abdominal tenderness. Labs unremarkable and patient afebrile. C dff negative. IV fluids given. Xray concerning for osteomyelitis in second toe distal phalynx. Previous amputations. Discussed with podiatry on call. Culture sensitivities reviewed. Per discussion and review of labs and xr results with podiatry, patient will resume Levaquin and follow up as scheduled with podiatry. Discussed plan with patient and his significant other. Stable for discharge home. ED return and follow up discussed prior to discharge. MIPS       REASSESSMENT          CRITICAL CARE TIME   Total Critical Care time was  minutes, excluding separately reportable procedures. There was a high probability of clinically significant/life threatening deterioration in the patient's condition which required my urgent intervention. CONSULTS:  None    PROCEDURES:  Unless otherwise noted below, none     Procedures        FINAL IMPRESSION      1. Diarrhea, unspecified type    2. Chronic osteomyelitis [facility])          DISPOSITION/PLAN   DISPOSITION Decision To Discharge 08/10/2022 06:58:19 PM      PATIENT REFERRED TO:  Juanjose Carreon, [address] [facility] [address]  [phone]      Follow-up this Tuesday as scheduled      DISCHARGE MEDICATIONS:  Discharge Medication List as of 8/10/2022  5:00 PM        Controlled Substances Monitoring:     No flowsheet data found.     (Please note that portions of this note were completed with a voice recognition program.  Efforts were made to edit the dictations but occasionally words are mis-transcribed.)    Estefana Cheadle, MD (electronically signed)  Attending Emergency Physician            Estefana Cheadle, MD  08/11/22 9173

## 2022-11-11 ENCOUNTER — OFFICE VISIT (OUTPATIENT)
Dept: PRIMARY CARE CLINIC | Age: 62
End: 2022-11-11
Payer: COMMERCIAL

## 2022-11-11 VITALS
SYSTOLIC BLOOD PRESSURE: 132 MMHG | HEIGHT: 70 IN | DIASTOLIC BLOOD PRESSURE: 86 MMHG | WEIGHT: 263 LBS | BODY MASS INDEX: 37.65 KG/M2 | OXYGEN SATURATION: 98 % | HEART RATE: 75 BPM

## 2022-11-11 DIAGNOSIS — G45.9 TIA (TRANSIENT ISCHEMIC ATTACK): Primary | ICD-10-CM

## 2022-11-11 DIAGNOSIS — E11.40 TYPE 2 DIABETES MELLITUS WITH DIABETIC NEUROPATHY, WITH LONG-TERM CURRENT USE OF INSULIN (HCC): ICD-10-CM

## 2022-11-11 DIAGNOSIS — Z79.4 TYPE 2 DIABETES MELLITUS WITH DIABETIC NEUROPATHY, WITH LONG-TERM CURRENT USE OF INSULIN (HCC): ICD-10-CM

## 2022-11-11 DIAGNOSIS — Z23 FLU VACCINE NEED: ICD-10-CM

## 2022-11-11 DIAGNOSIS — E08.42 DIABETIC POLYNEUROPATHY ASSOCIATED WITH DIABETES MELLITUS DUE TO UNDERLYING CONDITION (HCC): ICD-10-CM

## 2022-11-11 DIAGNOSIS — I10 ESSENTIAL HYPERTENSION: ICD-10-CM

## 2022-11-11 PROCEDURE — 90471 IMMUNIZATION ADMIN: CPT | Performed by: FAMILY MEDICINE

## 2022-11-11 PROCEDURE — 1036F TOBACCO NON-USER: CPT | Performed by: FAMILY MEDICINE

## 2022-11-11 PROCEDURE — 3052F HG A1C>EQUAL 8.0%<EQUAL 9.0%: CPT | Performed by: FAMILY MEDICINE

## 2022-11-11 PROCEDURE — 3074F SYST BP LT 130 MM HG: CPT | Performed by: FAMILY MEDICINE

## 2022-11-11 PROCEDURE — 99214 OFFICE O/P EST MOD 30 MIN: CPT | Performed by: FAMILY MEDICINE

## 2022-11-11 PROCEDURE — 2022F DILAT RTA XM EVC RTNOPTHY: CPT | Performed by: FAMILY MEDICINE

## 2022-11-11 PROCEDURE — 3017F COLORECTAL CA SCREEN DOC REV: CPT | Performed by: FAMILY MEDICINE

## 2022-11-11 PROCEDURE — 90674 CCIIV4 VAC NO PRSV 0.5 ML IM: CPT | Performed by: FAMILY MEDICINE

## 2022-11-11 PROCEDURE — 3078F DIAST BP <80 MM HG: CPT | Performed by: FAMILY MEDICINE

## 2022-11-11 PROCEDURE — G8417 CALC BMI ABV UP PARAM F/U: HCPCS | Performed by: FAMILY MEDICINE

## 2022-11-11 PROCEDURE — G8427 DOCREV CUR MEDS BY ELIG CLIN: HCPCS | Performed by: FAMILY MEDICINE

## 2022-11-11 PROCEDURE — G8482 FLU IMMUNIZE ORDER/ADMIN: HCPCS | Performed by: FAMILY MEDICINE

## 2022-11-11 RX ORDER — HYDROCODONE BITARTRATE AND ACETAMINOPHEN 5; 325 MG/1; MG/1
TABLET ORAL
COMMUNITY
Start: 2022-08-17 | End: 2022-11-11 | Stop reason: CLARIF

## 2022-11-11 RX ORDER — LEVOFLOXACIN 750 MG/1
750 TABLET ORAL DAILY
COMMUNITY
End: 2022-11-11 | Stop reason: CLARIF

## 2022-11-11 RX ORDER — INSULIN ASPART 100 [IU]/ML
INJECTION, SOLUTION INTRAVENOUS; SUBCUTANEOUS
COMMUNITY
Start: 2022-11-07 | End: 2022-11-11 | Stop reason: CLARIF

## 2022-11-11 RX ORDER — ATORVASTATIN CALCIUM 40 MG/1
TABLET, FILM COATED ORAL
COMMUNITY
Start: 2022-11-08

## 2022-11-11 NOTE — PROGRESS NOTES
Pt presents for hospital f/u from [facility]   Woke up Monday 11/7 had a hot spot in left thigh , later R side of face went numb. [facility] dx him with TIA   Discharged 11/8  TIA symptoms lasted for four hours and resolved  His testing was ok and was started on asa and Jardiance  Hba1c was 8.7        CURRENT ALLERGIES: Bactrim [sulfamethoxazole-trimethoprim]    PAST MEDICAL HISTORY:   Past Medical History:   Diagnosis Date    DM (diabetes mellitus) ([address]     Varicose veins of both legs with edema     many years       SURGICAL HISTORY:   Past Surgical History:   Procedure Laterality Date    COLONOSCOPY N/A 7/16/2018    COLONOSCOPY POLYPECTOMY SNARE/COLD BIOPSY performed by Misa Cronin MD at [address] [facility]  07/16/2018    -polyps(adenomatouspolyps,portions of adenomatous polyps and tubulovillous adenoma)    OTHER SURGICAL HISTORY      Laser surgery both eyes for diabetic retinopathy       FAMILY HISTORY:   Family History   Problem Relation Age of Onset    Diabetes Mother     Emphysema Father     Diabetes Father     Colon Cancer Father        SOCIAL HISTORY:   Social History     Tobacco Use    Smoking status: Never    Smokeless tobacco: Never   Substance Use Topics    Alcohol use: Yes     Comment: VERY RARE    Drug use: No     Prior to Admission medications    Medication Sig Start Date End Date Taking?  Authorizing Provider   atorvastatin (LIPITOR) 40 MG tablet TAKE 1 TABLET BY MOUTH ONCE DAILY 11/8/22  Yes Historical Provider, MD   empagliflozin (JARDIANCE) 10 MG tablet 10 mg 11/8/22 12/8/22 Yes Historical Provider, MD   RELION INSULIN SYRINGE 1ML/31G 31G X 5/16\" 1 ML MISC USE 1  TWICE DAILY 5/6/22  Yes Fransico Velasquez MD   INSULIN SYRINGE 1CC/29G (AIMSCO INSULIN SYR ULTRA THIN) 29G X 1/2\" 1 ML MISC 1 each by Does not apply route 2 times daily DX:E11.9 5/6/22  Yes Fransico Velasquez MD   Insulin NPH Isophane & Regular (HUMULIN;NOVOLIN) (70-30) 100 UNIT per ML injection pen Inject 55 Units into the skin 2 times daily 2/24/22  Yes Pavel Covington MD   sildenafil (VIAGRA) 50 MG tablet Take 1 tablet by mouth as needed for Erectile Dysfunction 2/24/22  Yes Pavel Covington MD   Insulin Pen Needle (PEN NEEDLES) 31G X 5 MM MISC Inject 1 Units into the skin 2 times daily Relion Pen Needle 11/23/21  Yes Enoc Barba MD   amLODIPine (NORVASC) 5 MG tablet Take 1 tablet by mouth daily 8/23/21  Yes Pavel Covington MD   losartan (COZAAR) 25 MG tablet Take 2 tablets by mouth daily 8/6/21  Yes Pavel Covington MD   insulin lispro, 1 Unit Dial, (HUMALOG KWIKPEN) 100 UNIT/ML SOPN Inject twice a day as per coverage schedule FS <150 -no coverage 151-200 - 4 units 201-250 - 6 units 251-300 - 8 units 301- 350 - 10 units 351- 400 -12 units >401- call MD 4/12/21  Yes Pavel Covington MD   aspirin 81 MG tablet Take 81 mg by mouth daily   Yes Historical Provider, MD   Continuous Blood Gluc Sensor (FREESTYLE LINDA 14 DAY SENSOR) MISC 2 Units by Does not apply route every 14 days  Patient not taking: Reported on 11/11/2022 2/24/22   Pavel Covington MD   furosemide (LASIX) 40 MG tablet TAKE 1 TABLET BY MOUTH ONCE DAILY  Patient not taking: No sig reported 8/4/21   Historical Provider, MD       Review of Systems:  Constitutional: negative for fevers or chills  Eyes: negative for visual disturbance   ENT: negative for sore throat or nasal congestion  Respiratory: negative for shortness of breath or cough  Cardiovascular: negative for chest pain ,palpitations,pnd,syncope  Gastrointestinal: negative for abd pain, nausea, vomiting, diarrhea , constipation,hemetemesis,stella,blood in stool  Genitourinary: negative for dysuria, urgency ,frequency,hematuria  Integument/breast: negative for skin rash or lesions  Neurological: negative for unilateral weakness, has bilat feet numbness, tingling.   Skeletal Muscular: no joint pain,jont swelling,back pain    Subjective:  Vitals:    11/11/22 0956   BP: 132/86   Pulse: 75   SpO2: 98% Exam:  GEN:   A & O x3, no apparent distress  EYES: No gross abnormalities. and PERRL  ENT:ENT exam normal, no neck nodes or sinus tenderness  NECK: normal, supple, no lymphadenopathy,  no carotid bruits  PULM: clear to auscultation bilaterally- no wheezes, rales or rhonchi, normal air movement, no respiratory distress  COR: regular rate & rhythm, no murmurs, and no gallops  ABD:  soft, non-tender, non-distended, normal bowel sounds, no masses or organomegaly  : deferred  EXT: Extremities: + 2 pedal pulses, no edema or calf tenderness, and warm to touch. Normal nails without lesions  Skeletomuscular:  NEURO: Motor-normal,DTR- normal,has numbness of feet  SKIN:  No skin lesions or rashes      Assessment:  1. TIA (transient ischemic attack)    2. Type 2 diabetes mellitus with diabetic neuropathy, with long-term current use of insulin ([address]    3. Essential hypertension    4. Diabetic polyneuropathy associated with diabetes mellitus due to underlying condition ([address]      Labs reviewed: cbc,cmp,hba1c  Other tests reviewed: ct head, MRI,carotid doppler  Plan:    ICD-10-CM    1. TIA (transient ischemic attack) - continue asa,monitor bp and blood syfar closely G45.9       2. Type 2 diabetes mellitus with diabetic neuropathy, with long-term current use of insulin ([facility]) - hba1c in hospital high,to continue new med jardiance and monitor blood sugars closely E11.40     Z79.4       3. Essential hypertension -well controlled I10       4. Diabetic polyneuropathy associated with diabetes mellitus due to underlying condition ([address]  E08.42       5. Flu vaccine need  Z23 Influenza, FLUCELVAX, (age 10 mo+), IM, Preservative Free, 0.5 mL     MI ADMIN INFLUENZA VIRUS VAC                  Electronically signed by Sanjana Raman MD on 11/11/2022 at 10:25 AM      No orders of the defined types were placed in this encounter. No follow-ups on file. No orders of the defined types were placed in this encounter.     Medication directions and side effects discussed      Electronically signed by Adonay Dimas MD on 11/11/2022 at 10:25 Wale Echavarria MD, MD

## 2022-11-21 RX ORDER — SYRING-NEEDL,DISP,INSUL,0.3 ML 31GX15/64"
SYRINGE, EMPTY DISPOSABLE MISCELLANEOUS
Qty: 100 EACH | Refills: 0 | Status: SHIPPED | OUTPATIENT
Start: 2022-11-21

## 2022-11-21 RX ORDER — BLOOD SUGAR DIAGNOSTIC
1 STRIP MISCELLANEOUS 2 TIMES DAILY
Qty: 200 EACH | Refills: 5 | Status: SHIPPED | OUTPATIENT
Start: 2022-11-21

## 2022-12-02 RX ORDER — ATORVASTATIN CALCIUM 40 MG/1
TABLET, FILM COATED ORAL
Qty: 30 TABLET | Refills: 3 | Status: SHIPPED | OUTPATIENT
Start: 2022-12-02

## 2023-01-06 ENCOUNTER — OFFICE VISIT (OUTPATIENT)
Dept: PRIMARY CARE CLINIC | Age: 63
End: 2023-01-06

## 2023-01-06 VITALS
RESPIRATION RATE: 18 BRPM | HEIGHT: 70 IN | OXYGEN SATURATION: 98 % | BODY MASS INDEX: 38.11 KG/M2 | DIASTOLIC BLOOD PRESSURE: 90 MMHG | WEIGHT: 266.2 LBS | SYSTOLIC BLOOD PRESSURE: 138 MMHG | HEART RATE: 90 BPM

## 2023-01-06 DIAGNOSIS — E11.40 TYPE 2 DIABETES MELLITUS WITH DIABETIC NEUROPATHY, WITH LONG-TERM CURRENT USE OF INSULIN (HCC): ICD-10-CM

## 2023-01-06 DIAGNOSIS — E08.42 DIABETIC POLYNEUROPATHY ASSOCIATED WITH DIABETES MELLITUS DUE TO UNDERLYING CONDITION (HCC): ICD-10-CM

## 2023-01-06 DIAGNOSIS — Z79.4 TYPE 2 DIABETES MELLITUS WITH DIABETIC NEUROPATHY, WITH LONG-TERM CURRENT USE OF INSULIN (HCC): ICD-10-CM

## 2023-01-06 DIAGNOSIS — Z12.11 SCREENING FOR COLON CANCER: Primary | ICD-10-CM

## 2023-01-06 DIAGNOSIS — I10 ESSENTIAL HYPERTENSION: ICD-10-CM

## 2023-01-06 PROBLEM — Z78.9 NO KNOWN PROBLEMS: Status: ACTIVE | Noted: 2023-01-06

## 2023-01-06 PROBLEM — E11.8 TYPE 2 DIABETES WITH COMPLICATION (HCC): Status: ACTIVE | Noted: 2023-01-06

## 2023-01-06 LAB — HBA1C MFR BLD: 8.5 %

## 2023-01-06 RX ORDER — FUROSEMIDE 20 MG/1
20 TABLET ORAL DAILY
Qty: 90 TABLET | Refills: 0 | Status: SHIPPED | OUTPATIENT
Start: 2023-01-06 | End: 2023-04-06

## 2023-01-06 RX ORDER — POTASSIUM CHLORIDE 20 MEQ/1
20 TABLET, EXTENDED RELEASE ORAL DAILY
Qty: 90 TABLET | Refills: 0 | Status: SHIPPED | OUTPATIENT
Start: 2023-01-06

## 2023-01-06 SDOH — ECONOMIC STABILITY: FOOD INSECURITY: WITHIN THE PAST 12 MONTHS, YOU WORRIED THAT YOUR FOOD WOULD RUN OUT BEFORE YOU GOT MONEY TO BUY MORE.: NEVER TRUE

## 2023-01-06 SDOH — ECONOMIC STABILITY: FOOD INSECURITY: WITHIN THE PAST 12 MONTHS, THE FOOD YOU BOUGHT JUST DIDN'T LAST AND YOU DIDN'T HAVE MONEY TO GET MORE.: NEVER TRUE

## 2023-01-06 ASSESSMENT — PATIENT HEALTH QUESTIONNAIRE - PHQ9
SUM OF ALL RESPONSES TO PHQ9 QUESTIONS 1 & 2: 0
1. LITTLE INTEREST OR PLEASURE IN DOING THINGS: 0
SUM OF ALL RESPONSES TO PHQ QUESTIONS 1-9: 0
2. FEELING DOWN, DEPRESSED OR HOPELESS: 0

## 2023-01-06 ASSESSMENT — SOCIAL DETERMINANTS OF HEALTH (SDOH): HOW HARD IS IT FOR YOU TO PAY FOR THE VERY BASICS LIKE FOOD, HOUSING, MEDICAL CARE, AND HEATING?: NOT HARD AT ALL

## 2023-01-06 NOTE — PROGRESS NOTES
Pranav Melton is a 65 y.o. male here for routine follow up of diabetes. He has been checking  his blood glucose levels regularly. Patient BGL was 170 this morning. He reports numbness and tingling in the hands and feet. He has been compliant with diet and exercise. He has been compliant with his medications. He is tolerating medications. Hypertension: Patient here for follow-up of elevated blood pressure. He is not exercising and is adherent to low salt diet. Blood pressure is well monitored at home. Cardiac symptoms none. Patient denies chest pain, dyspnea, fatigue, and palpitations. Cardiovascular risk factors: advanced age (older than 54 for men, 72 for women), diabetes mellitus, hypertension, male gender, obesity (BMI >= 30 kg/m2), and sedentary lifestyle. Use of agents associated with hypertension: none. Hyperlipidemia: Patient presents with hyperlipidemia.          Allergies:  Bactrim [sulfamethoxazole-trimethoprim]    Past Medical History:    Past Medical History:   Diagnosis Date    DM (diabetes mellitus) ([facility]     Varicose veins of both legs with edema     many years       Past Surgical History:    Past Surgical History:   Procedure Laterality Date    COLONOSCOPY N/A 7/16/2018    COLONOSCOPY POLYPECTOMY SNARE/COLD BIOPSY performed by Bob Vargas MD at [address] [place] [address]  07/16/2018    -polyps(adenomatouspolyps,portions of adenomatous polyps and tubulovillous adenoma)    OTHER SURGICAL HISTORY      Laser surgery both eyes for diabetic retinopathy       Social History:   Social History     Tobacco Use    Smoking status: Never    Smokeless tobacco: Never   Substance Use Topics    Alcohol use: Yes     Comment: VERY RARE       Family History:   Family History   Problem Relation Age of Onset    Diabetes Mother     Emphysema Father     Diabetes Father     Colon Cancer Father          Review of Systems:  Constitutional: negative for fever or chills  Eyes: negative for visual disturbance   ENT: negative for sore throat or nasal congestion  Respiratory: negative for cough, shortness of breath and sputum  Cardiovascular: negative for chest pain,pnd,claudication or palpitations  Gastrointestinal: negative for abd pain, stella,nausea, vomiting, diarrhea or constipation  Genitourinary: negative for dysuria,,hematuria urgency or frequency  Musculoskeletal:negative for arthralgias, muscle weakness and stiff joints   Integument/breast: negative for skin rash or lesions  Neurological: positive for   numbness and tingling of feet. Psych: negative for anxiety, depression, suicidial ideation and suicidal attempt. Objective:  Physical Exam:  BP (!) 138/90 (Site: Right Upper Arm, Position: Sitting, Cuff Size: Large Adult)   Pulse 90   Resp 18   Ht 5' 10\" (1.778 m)   Wt 266 lb 3.2 oz (120.7 kg)   SpO2 98%   BMI 38.20 kg/m²   GEN:   He is alert and oriented  ENT:    ENT exam normal, no neck nodes or sinus tenderness  NECK:   neck supple and non tender without mass, no thyromegaly or thyroid nodules, no cervical lymphadenopathy  EYES:   No gross abnormalities. and PERRL  CVS:     CVS exam BP noted to be well controlled today in office, S1, S2 normal, no gallop, no murmur, chest clear, no JVD, no HSM, no edema  PULM:   chest clear, no wheezing, rales, normal symmetric air entry, no tachypnea, retractions or cyanosis  ABD:   Abdomen soft, non-tender. BS normal. No masses,  No organomegaly  MUSC: no joint tenderness, deformity or swelling  Skin : no  rash or lesions  EXT: {EXTREMITIES EXAM:68835::\"Extremities: + 2 pedal pulses, 1 + edema ,no calf tenderness, and warm to touch. NEURO: DTR -diminished        Diagnostic Data:  Lab Results   Component Value Date    GLUCOSE 106 (H) 08/10/2022    LABA1C 8.0 02/24/2022    BUN 15 08/10/2022     (L) 08/10/2022    K 3.9 08/10/2022    CALCIUM 8.5 (L) 08/10/2022     08/10/2022    CO2 23 08/10/2022       Assessment:  1.  Type 2 diabetes mellitus with diabetic neuropathy, with long-term current use of insulin ([facility]    2. Essential hypertension    3. Diabetic polyneuropathy associated with diabetes mellitus due to underlying condition ([facility]      Plan   Diagnosis Orders   1. Type 2 diabetes mellitus with diabetic neuropathy, with long-term current use of insulin (HCC) -not well controlled,increase NPH to 60 units bid,insulin coverage ac ahd hs, continue jardiance POCT glycosylated hemoglobin (Hb A1C)      2. Essential hypertension -borderline, has edema, add lasix and kcl Basic Metabolic Panel      3. Diabetic polyneuropathy associated with diabetes mellitus due to underlying condition (HCC)            Check BS 2 times per day  Encouraged low fat, low sodium, and diabetic, 1800 calorie diet.   Goal for blood pressure control is 120/80  Recommended regular exercise as tolerated, 5 times per week  Labs reviewed -hba1c 8.5  Labs ordered: -bmp  ascivd risk  Orders Placed This Encounter   Procedures    Basic Metabolic Panel     Standing Status:   Future     Standing Expiration Date:   1/6/2024    POCT glycosylated hemoglobin (Hb A1C)       Current Outpatient Medications   Medication Sig Dispense Refill    furosemide (LASIX) 20 MG tablet Take 1 tablet by mouth daily 90 tablet 0    potassium chloride (KLOR-CON M) 20 MEQ extended release tablet Take 1 tablet by mouth daily 90 tablet 0    atorvastatin (LIPITOR) 40 MG tablet TAKE 1 TABLET BY MOUTH ONCE DAILY 30 tablet 3    BD VEO INSULIN SYRINGE U/F 31G X 15/64\" 1 ML MISC USE 1 SYRINGE TWICE DAILY 100 each 0    Insulin Syringe-Needle U-100 (RELION INSULIN SYRINGE 1ML/31G) 31G X 5/16\" 1 ML MISC Inject 1 each into the skin 2 times daily 200 each 5    INSULIN SYRINGE 1CC/29G (AIMSCO INSULIN SYR ULTRA THIN) 29G X 1/2\" 1 ML MISC 1 each by Does not apply route 2 times daily DX:E11.9 200 each 3    Insulin NPH Isophane & Regular (HUMULIN;NOVOLIN) (70-30) 100 UNIT per ML injection pen Inject 55 Units into the skin 2 times daily 5 pen 3    sildenafil (VIAGRA) 50 MG tablet Take 1 tablet by mouth as needed for Erectile Dysfunction 10 tablet 0    Insulin Pen Needle (PEN NEEDLES) 31G X 5 MM MISC Inject 1 Units into the skin 2 times daily Relion Pen Needle 100 each 5    amLODIPine (NORVASC) 5 MG tablet Take 1 tablet by mouth daily 90 tablet 0    losartan (COZAAR) 25 MG tablet Take 2 tablets by mouth daily 180 tablet 0    insulin lispro, 1 Unit Dial, (HUMALOG KWIKPEN) 100 UNIT/ML SOPN Inject twice a day as per coverage schedule FS <150 -no coverage 151-200 - 4 units 201-250 - 6 units 251-300 - 8 units 301- 350 - 10 units 351- 400 -12 units >401- call MD 1 pen 3    aspirin 81 MG tablet Take 81 mg by mouth daily      empagliflozin (JARDIANCE) 10 MG tablet Take 1 tablet by mouth daily 30 tablet 3    Continuous Blood Gluc Sensor (FREESTYLE LINDA 14 DAY SENSOR) MISC 2 Units by Does not apply route every 14 days (Patient not taking: No sig reported) 2 each 3     No current facility-administered medications for this visit. He is going to have his colonoscopy in [place] after move  No follow-ups on file.       Electronically signed by Warner Ulrich MD on 1/6/2023 at 3:19 PM

## 2023-01-06 NOTE — PATIENT INSTRUCTIONS
SURVEY:    You may be receiving a survey from [employer] regarding your visit today. You may get this in the mail, through your MyChart, or in your email. Please complete the survey to enable us to provide the highest quality of care to you and your family. If you cannot score us a very good (5 Stars) on any question, please call the office to discuss how we could of made your experience exceptional.    Thank you!     Dr. López Trivedi, SUSANA Chun, [address] [place] [address]    Phone: [phone]  Fax: [phone]    Office Hours:   Marvine Kocher, [facility], F: 8-5

## 2023-01-10 RX ORDER — AMLODIPINE BESYLATE 5 MG/1
5 TABLET ORAL DAILY
Qty: 90 TABLET | Refills: 0 | Status: SHIPPED | OUTPATIENT
Start: 2023-01-10

## 2023-01-10 RX ORDER — LOSARTAN POTASSIUM 25 MG/1
50 TABLET ORAL DAILY
Qty: 180 TABLET | Refills: 0 | Status: SHIPPED | OUTPATIENT
Start: 2023-01-10

## 2023-03-24 ENCOUNTER — OFFICE VISIT (OUTPATIENT)
Dept: PRIMARY CARE CLINIC | Age: 63
End: 2023-03-24

## 2023-03-24 VITALS
WEIGHT: 263 LBS | SYSTOLIC BLOOD PRESSURE: 138 MMHG | HEIGHT: 70 IN | HEART RATE: 76 BPM | DIASTOLIC BLOOD PRESSURE: 82 MMHG | RESPIRATION RATE: 18 BRPM | BODY MASS INDEX: 37.65 KG/M2 | OXYGEN SATURATION: 98 %

## 2023-03-24 DIAGNOSIS — E08.42 DIABETIC POLYNEUROPATHY ASSOCIATED WITH DIABETES MELLITUS DUE TO UNDERLYING CONDITION (HCC): ICD-10-CM

## 2023-03-24 DIAGNOSIS — E11.40 TYPE 2 DIABETES MELLITUS WITH DIABETIC NEUROPATHY, WITH LONG-TERM CURRENT USE OF INSULIN (HCC): Primary | ICD-10-CM

## 2023-03-24 DIAGNOSIS — I10 ESSENTIAL HYPERTENSION: ICD-10-CM

## 2023-03-24 DIAGNOSIS — Z79.4 TYPE 2 DIABETES MELLITUS WITH DIABETIC NEUROPATHY, WITH LONG-TERM CURRENT USE OF INSULIN (HCC): Primary | ICD-10-CM

## 2023-03-24 LAB — HBA1C MFR BLD: 9.6 %

## 2023-03-24 RX ORDER — INSULIN LISPRO 100 [IU]/ML
INJECTION, SOLUTION INTRAVENOUS; SUBCUTANEOUS
Qty: 1 ADJUSTABLE DOSE PRE-FILLED PEN SYRINGE | Refills: 0 | Status: SHIPPED | OUTPATIENT
Start: 2023-03-24

## 2023-03-24 RX ORDER — CLOTRIMAZOLE AND BETAMETHASONE DIPROPIONATE 10; .64 MG/G; MG/G
CREAM TOPICAL 2 TIMES DAILY
COMMUNITY
End: 2023-03-24 | Stop reason: SDUPTHER

## 2023-03-24 RX ORDER — FUROSEMIDE 20 MG/1
20 TABLET ORAL DAILY
Qty: 90 TABLET | Refills: 0 | Status: SHIPPED | OUTPATIENT
Start: 2023-03-24 | End: 2023-06-22

## 2023-03-24 RX ORDER — AMLODIPINE BESYLATE 5 MG/1
5 TABLET ORAL DAILY
Qty: 90 TABLET | Refills: 0 | Status: SHIPPED | OUTPATIENT
Start: 2023-03-24

## 2023-03-24 RX ORDER — SYRINGE-NEEDLE,INSULIN,0.5 ML 27GX1/2"
1 SYRINGE, EMPTY DISPOSABLE MISCELLANEOUS 2 TIMES DAILY
Qty: 200 EACH | Refills: 5 | Status: SHIPPED | OUTPATIENT
Start: 2023-03-24

## 2023-03-24 RX ORDER — LOSARTAN POTASSIUM 25 MG/1
50 TABLET ORAL DAILY
Qty: 180 TABLET | Refills: 0 | Status: SHIPPED | OUTPATIENT
Start: 2023-03-24

## 2023-03-24 RX ORDER — PEN NEEDLE, DIABETIC 30 GX3/16"
1 NEEDLE, DISPOSABLE MISCELLANEOUS 2 TIMES DAILY
Qty: 100 EACH | Refills: 5 | Status: SHIPPED | OUTPATIENT
Start: 2023-03-24

## 2023-03-24 RX ORDER — ATORVASTATIN CALCIUM 40 MG/1
TABLET, FILM COATED ORAL
Qty: 30 TABLET | Refills: 3 | Status: SHIPPED | OUTPATIENT
Start: 2023-03-24

## 2023-03-24 RX ORDER — POTASSIUM CHLORIDE 20 MEQ/1
20 TABLET, EXTENDED RELEASE ORAL DAILY
Qty: 90 TABLET | Refills: 0 | Status: SHIPPED | OUTPATIENT
Start: 2023-03-24

## 2023-03-24 RX ORDER — SYRING-NEEDL,DISP,INSUL,0.3 ML 31GX15/64"
SYRINGE, EMPTY DISPOSABLE MISCELLANEOUS
Qty: 100 EACH | Refills: 0 | Status: SHIPPED | OUTPATIENT
Start: 2023-03-24

## 2023-03-24 RX ORDER — CLOTRIMAZOLE AND BETAMETHASONE DIPROPIONATE 10; .64 MG/G; MG/G
CREAM TOPICAL 2 TIMES DAILY
Qty: 45 G | Refills: 0 | Status: SHIPPED | OUTPATIENT
Start: 2023-03-24

## 2023-03-24 SDOH — ECONOMIC STABILITY: FOOD INSECURITY: WITHIN THE PAST 12 MONTHS, YOU WORRIED THAT YOUR FOOD WOULD RUN OUT BEFORE YOU GOT MONEY TO BUY MORE.: NEVER TRUE

## 2023-03-24 SDOH — ECONOMIC STABILITY: FOOD INSECURITY: WITHIN THE PAST 12 MONTHS, THE FOOD YOU BOUGHT JUST DIDN'T LAST AND YOU DIDN'T HAVE MONEY TO GET MORE.: NEVER TRUE

## 2023-03-24 SDOH — ECONOMIC STABILITY: HOUSING INSECURITY
IN THE LAST 12 MONTHS, WAS THERE A TIME WHEN YOU DID NOT HAVE A STEADY PLACE TO SLEEP OR SLEPT IN A SHELTER (INCLUDING NOW)?: NO

## 2023-03-24 SDOH — ECONOMIC STABILITY: INCOME INSECURITY: HOW HARD IS IT FOR YOU TO PAY FOR THE VERY BASICS LIKE FOOD, HOUSING, MEDICAL CARE, AND HEATING?: NOT HARD AT ALL

## 2023-03-24 NOTE — PROGRESS NOTES
Jae York is a 65 y.o. male here for routine follow up of diabetes. He has been checking  his blood glucose levels regularly. Patient reports BGL was 163 in the a.m. and 263 yesterday a.m. He denies numbness and tingling in the hands and feet. He has not been compliant with diet and exercise. He has been compliant with his medications. He is tolerating medications. Hypertension: Patient here for follow-up of elevated blood pressure. He is not exercising and is not adherent to low salt diet. Blood pressure is  not well monitored at home. Cardiac symptoms none. Patient denies chest pain, dyspnea, fatigue, and palpitations. Cardiovascular risk factors: advanced age (older than 54 for men, 72 for women), diabetes mellitus, dyslipidemia, hypertension, and male gender. Use of agents associated with hypertension: none. Hyperlipidemia: Patient presents with hyperlipidemia. There is a family history of hyperlipidemia. Patient will be losing job and insurance at end of month and lost his father last week. Patient reports feelings of stress.     Patient reports yeast infection to foreskin of penis and requesting refill on clotrimazole and all other medications        Allergies:  Bactrim [sulfamethoxazole-trimethoprim]    Past Medical History:    Past Medical History:   Diagnosis Date    DM (diabetes mellitus) ([facility]     Varicose veins of both legs with edema     many years       Past Surgical History:    Past Surgical History:   Procedure Laterality Date    COLONOSCOPY N/A 7/16/2018    COLONOSCOPY POLYPECTOMY SNARE/COLD BIOPSY performed by Reta Carrero MD at [address]  07/16/2018    -polyps(adenomatouspolyps,portions of adenomatous polyps and tubulovillous adenoma)    OTHER SURGICAL HISTORY      Laser surgery both eyes for diabetic retinopathy       Social History:   Social History     Tobacco Use    Smoking status: Never    Smokeless tobacco: Never   Substance Use

## 2023-03-24 NOTE — PATIENT INSTRUCTIONS
SURVEY:    You may be receiving a survey from [employer] regarding your visit today. You may get this in the mail, through your MyChart, or in your email. Please complete the survey to enable us to provide the highest quality of care to you and your family. If you cannot score us a very good (5 Stars) on any question, please call the office to discuss how we could of made your experience exceptional.    Thank you!     WESLEY Henson RN   [facility], [address] [place] [address]    Phone: [phone]  Fax: [phone]    Office Hours:   Angel Perrin, [address] [facility], F: 8-5

## 2023-04-25 ENCOUNTER — OFFICE VISIT (OUTPATIENT)
Dept: PRIMARY CARE CLINIC | Age: 63
End: 2023-04-25

## 2023-04-25 VITALS
OXYGEN SATURATION: 100 % | SYSTOLIC BLOOD PRESSURE: 132 MMHG | BODY MASS INDEX: 37.56 KG/M2 | WEIGHT: 261.8 LBS | HEART RATE: 56 BPM | RESPIRATION RATE: 18 BRPM | DIASTOLIC BLOOD PRESSURE: 86 MMHG

## 2023-04-25 DIAGNOSIS — Z79.4 TYPE 2 DIABETES MELLITUS WITH DIABETIC NEUROPATHY, WITH LONG-TERM CURRENT USE OF INSULIN (HCC): Primary | ICD-10-CM

## 2023-04-25 DIAGNOSIS — E78.5 DYSLIPIDEMIA: ICD-10-CM

## 2023-04-25 DIAGNOSIS — I10 ESSENTIAL HYPERTENSION: ICD-10-CM

## 2023-04-25 DIAGNOSIS — E11.40 TYPE 2 DIABETES MELLITUS WITH DIABETIC NEUROPATHY, WITH LONG-TERM CURRENT USE OF INSULIN (HCC): Primary | ICD-10-CM

## 2023-04-25 PROCEDURE — 3079F DIAST BP 80-89 MM HG: CPT | Performed by: STUDENT IN AN ORGANIZED HEALTH CARE EDUCATION/TRAINING PROGRAM

## 2023-04-25 PROCEDURE — 3075F SYST BP GE 130 - 139MM HG: CPT | Performed by: STUDENT IN AN ORGANIZED HEALTH CARE EDUCATION/TRAINING PROGRAM

## 2023-04-25 PROCEDURE — 99214 OFFICE O/P EST MOD 30 MIN: CPT | Performed by: STUDENT IN AN ORGANIZED HEALTH CARE EDUCATION/TRAINING PROGRAM

## 2023-04-25 PROCEDURE — 3046F HEMOGLOBIN A1C LEVEL >9.0%: CPT | Performed by: STUDENT IN AN ORGANIZED HEALTH CARE EDUCATION/TRAINING PROGRAM

## 2023-04-25 NOTE — PATIENT INSTRUCTIONS
SURVEY:    You may be receiving a survey from [employer] regarding your visit today. Please complete the survey to enable us to provide the highest quality of care to you and your family. If you cannot score us a very good on any question, please call the office to discuss how we could have made your experience a very good one. Thank you.

## 2023-04-25 NOTE — PROGRESS NOTES
light-headedness, numbness and headaches. Psychiatric/Behavioral: Negative for sleep disturbance. Negative for confusion and suicidal ideas. Objective:    /86 (Site: Right Upper Arm, Position: Sitting)   Pulse 56   Resp 18   Wt 261 lb 12.8 oz (118.8 kg)   SpO2 100%   BMI 37.56 kg/m²    BP Readings from Last 3 Encounters:   04/25/23 132/86   03/24/23 138/82   01/06/23 (!) 138/90     Physical Exam  Constitutional: Patient is oriented to person, place, and time. Patient appears well-developed and well-nourished. No distress. HENT: Head: Normocephalic and atraumatic. Eyes: Pupils are equal, round, and reactive to light. Conjunctivae are normal. Right eye exhibits no discharge. Left eye exhibits no discharge. Cardiovascular: Normal rate, regular rhythm and normal heart sounds. Pulmonary/Chest: Effort normal and breath sounds normal. No respiratory distress. Patient has no wheezes. Abdominal: Soft. Bowel sounds are normal. Patient exhibits no distension. There is no tenderness. Musculoskeletal:  Patient exhibits no edema and tenderness. Patient exhibits no deformity. Neurological: Patient is alert and oriented to person, place, and time. Skin: Skin is warm and dry. Patient is not diaphoretic. Psychiatric: Patient's speech is normal and behavior is normal. Thought content normal.   Vitals reviewed.       Lab Results   Component Value Date    WBC 8.8 08/10/2022    HGB 13.6 08/10/2022    HCT 40.8 08/10/2022     08/10/2022    CHOL 190 04/12/2021    TRIG 185 (H) 04/12/2021    HDL 42 04/12/2021    ALT 22 08/10/2022    AST 17 08/10/2022     (L) 08/10/2022    K 3.9 08/10/2022     08/10/2022    CREATININE 0.76 08/10/2022    BUN 15 08/10/2022    CO2 23 08/10/2022    PSA 0.89 04/12/2021    LABA1C 9.6 03/24/2023    LABMICR 25 (H) 07/27/2022     Lab Results   Component Value Date    CALCIUM 8.5 (L) 08/10/2022    PHOS 3.5 09/16/2021     Lab Results   Component Value Date

## 2023-08-15 RX ORDER — POTASSIUM CHLORIDE 20 MEQ/1
20 TABLET, EXTENDED RELEASE ORAL DAILY
Qty: 90 TABLET | Refills: 0 | Status: SHIPPED | OUTPATIENT
Start: 2023-08-15

## 2023-08-15 RX ORDER — FUROSEMIDE 20 MG/1
20 TABLET ORAL DAILY
Qty: 90 TABLET | Refills: 0 | Status: SHIPPED | OUTPATIENT
Start: 2023-08-15 | End: 2023-11-13

## 2023-08-15 RX ORDER — LOSARTAN POTASSIUM 25 MG/1
50 TABLET ORAL DAILY
Qty: 180 TABLET | Refills: 0 | Status: SHIPPED | OUTPATIENT
Start: 2023-08-15 | End: 2023-11-13

## 2023-08-15 NOTE — TELEPHONE ENCOUNTER
Patient called and reports that he has moved to [place] and is in the process of getting a new PCP however, was wondering if you can send in a 30 days supply to the pended meds to get him by?

## 2023-11-29 RX ORDER — LOSARTAN POTASSIUM 25 MG/1
50 TABLET ORAL DAILY
Qty: 180 TABLET | Refills: 0 | Status: SHIPPED | OUTPATIENT
Start: 2023-11-29

## 2024-01-29 RX ORDER — FUROSEMIDE 20 MG/1
20 TABLET ORAL DAILY
Qty: 90 TABLET | Refills: 0 | Status: SHIPPED | OUTPATIENT
Start: 2024-01-29

## 2024-01-29 RX ORDER — POTASSIUM CHLORIDE 1.5 G/1
POWDER, FOR SOLUTION ORAL
Qty: 90 PACKET | Refills: 0 | Status: SHIPPED | OUTPATIENT
Start: 2024-01-29

## 2024-04-29 RX ORDER — FUROSEMIDE 20 MG/1
20 TABLET ORAL DAILY
Qty: 90 TABLET | Refills: 0 | Status: SHIPPED | OUTPATIENT
Start: 2024-04-29

## 2024-04-29 RX ORDER — LOSARTAN POTASSIUM 25 MG/1
50 TABLET ORAL DAILY
Qty: 180 TABLET | Refills: 0 | Status: SHIPPED | OUTPATIENT
Start: 2024-04-29

## 2024-04-29 RX ORDER — POTASSIUM CHLORIDE 1.5 G/1.58G
POWDER, FOR SOLUTION ORAL
Qty: 90 PACKET | Refills: 0 | Status: SHIPPED | OUTPATIENT
Start: 2024-04-29

## 2024-07-29 RX ORDER — POTASSIUM CHLORIDE 1.5 G/1.58G
POWDER, FOR SOLUTION ORAL
Qty: 90 PACKET | Refills: 0 | OUTPATIENT
Start: 2024-07-29

## 2024-07-29 RX ORDER — FUROSEMIDE 20 MG
20 TABLET ORAL DAILY
Qty: 90 TABLET | Refills: 0 | OUTPATIENT
Start: 2024-07-29

## 2024-07-29 RX ORDER — LOSARTAN POTASSIUM 25 MG/1
50 TABLET ORAL DAILY
Qty: 180 TABLET | Refills: 0 | OUTPATIENT
Start: 2024-07-29

## (undated) DEVICE — TRAP SURG QUAD PARABOLA SLOT DSGN SFTY SCRN TRAPEASE

## (undated) DEVICE — MEDI-VAC NON-CONDUCTIVE TUBING7MM X 30.5 (100FT): Brand: CARDINAL HEALTH

## (undated) DEVICE — ELECTRODE ES AD CRD L15FT DISP FOR PT BELOW 30LB REM

## (undated) DEVICE — ACUSNARE POLYPECTOMY SNARE: Brand: ACUSNARE

## (undated) DEVICE — SOLUTION IV IRRIG POUR BRL 0.9% SODIUM CHL 2F7124

## (undated) DEVICE — NEEDLE 25GAX5.0MM INJ CARR LOCKE

## (undated) DEVICE — SNARE EXACTO COLD

## (undated) DEVICE — CANNULA ORAL NSL AD CO2 N INTUB O2 DEL DISP TRU LNK